# Patient Record
Sex: FEMALE | Race: WHITE | NOT HISPANIC OR LATINO | Employment: FULL TIME | URBAN - METROPOLITAN AREA
[De-identification: names, ages, dates, MRNs, and addresses within clinical notes are randomized per-mention and may not be internally consistent; named-entity substitution may affect disease eponyms.]

---

## 2018-01-12 RX ORDER — DROSPIRENONE AND ETHINYL ESTRADIOL 0.02-3(28)
KIT ORAL
Qty: 84 TABLET | Refills: 0 | OUTPATIENT
Start: 2018-01-12

## 2018-09-07 ENCOUNTER — OFFICE VISIT (OUTPATIENT)
Dept: OBSTETRICS AND GYNECOLOGY | Facility: CLINIC | Age: 28
End: 2018-09-07

## 2018-09-07 VITALS
BODY MASS INDEX: 21.56 KG/M2 | DIASTOLIC BLOOD PRESSURE: 81 MMHG | HEART RATE: 104 BPM | SYSTOLIC BLOOD PRESSURE: 118 MMHG | HEIGHT: 71 IN | WEIGHT: 154 LBS

## 2018-09-07 DIAGNOSIS — Z30.41 ENCOUNTER FOR SURVEILLANCE OF CONTRACEPTIVE PILLS: ICD-10-CM

## 2018-09-07 DIAGNOSIS — Z01.419 PAP SMEAR, AS PART OF ROUTINE GYNECOLOGICAL EXAMINATION: Primary | ICD-10-CM

## 2018-09-07 DIAGNOSIS — Z72.0 TOBACCO USE: ICD-10-CM

## 2018-09-07 DIAGNOSIS — Z01.419 ENCOUNTER FOR GYNECOLOGICAL EXAMINATION WITHOUT ABNORMAL FINDING: ICD-10-CM

## 2018-09-07 PROCEDURE — 99395 PREV VISIT EST AGE 18-39: CPT | Performed by: OBSTETRICS & GYNECOLOGY

## 2018-09-07 RX ORDER — DROSPIRENONE AND ETHINYL ESTRADIOL 0.02-3(28)
1 KIT ORAL DAILY
Qty: 28 TABLET | Refills: 0 | Status: SHIPPED | OUTPATIENT
Start: 2018-09-07 | End: 2018-09-07 | Stop reason: SDUPTHER

## 2018-09-07 RX ORDER — DEXTROAMPHETAMINE SACCHARATE, AMPHETAMINE ASPARTATE, DEXTROAMPHETAMINE SULFATE AND AMPHETAMINE SULFATE 7.5; 7.5; 7.5; 7.5 MG/1; MG/1; MG/1; MG/1
TABLET ORAL
Refills: 0 | COMMUNITY
Start: 2018-08-31 | End: 2020-01-31

## 2018-09-07 RX ORDER — CITALOPRAM 40 MG/1
TABLET ORAL
Refills: 1 | COMMUNITY
Start: 2018-05-31 | End: 2020-01-31

## 2018-09-07 RX ORDER — DROSPIRENONE AND ETHINYL ESTRADIOL 0.02-3(28)
1 KIT ORAL DAILY
Qty: 84 TABLET | Refills: 3 | Status: SHIPPED | OUTPATIENT
Start: 2018-09-07 | End: 2020-01-31

## 2018-09-07 NOTE — PROGRESS NOTES
Subjective   Lin Harp is a 27 y.o. female  1, Para 1 AB 0, Living 1.  Last annual 2 years, last pap 3 years,.  Cc: Annual exam  History of Present Illness  She denies any gynecologic problems at this time.  The following portions of the patient's history were reviewed and updated as appropriate: allergies, current medications, past family history, past medical history, past social history, past surgical history and problem list.    Review of Systems   Constitutional: Negative for activity change, fatigue, fever and unexpected weight change.   HENT: Negative for hearing loss, sore throat and voice change.    Respiratory: Negative for cough, chest tightness, shortness of breath and wheezing.    Cardiovascular: Negative for chest pain, palpitations and leg swelling.   Gastrointestinal: Negative for abdominal distention, abdominal pain, anal bleeding, blood in stool, constipation, diarrhea, nausea, rectal pain and vomiting.   Endocrine: Negative for cold intolerance and heat intolerance.   Genitourinary: Negative for difficulty urinating, dyspareunia, dysuria, flank pain, frequency, genital sores, menstrual problem, pelvic pain, urgency, vaginal bleeding, vaginal discharge and vaginal pain.   Musculoskeletal: Negative for arthralgias and back pain.   Skin: Negative for rash.   Allergic/Immunologic: Negative for environmental allergies and food allergies.   Neurological: Negative for dizziness, tremors, syncope, weakness, light-headedness, numbness and headaches.   Hematological: Negative for adenopathy. Does not bruise/bleed easily.   Psychiatric/Behavioral: Negative for agitation, behavioral problems, self-injury, sleep disturbance and suicidal ideas. The patient is not nervous/anxious and is not hyperactive.          Past Medical History:   Diagnosis Date   • ADD (attention deficit disorder)      Menstrual History:  OB History      Para Term  AB Living    1 1 1     1    SAB TAB Ectopic Molar  "Multiple Live Births                        Menarche age:10  Patient's last menstrual period was 2018 (exact date).       Past Surgical History:   Procedure Laterality Date   • BILE DUCT STENT PLACEMENT      Subsequent removal   •  SECTION       OB History      Para Term  AB Living    1 1 1     1    SAB TAB Ectopic Molar Multiple Live Births                       Family History   Problem Relation Age of Onset   • Lung cancer Maternal Grandmother    • Cervical cancer Maternal Aunt    • Stroke Mother    • Stroke Paternal Grandmother      History   Smoking Status   • Current Every Day Smoker   • Packs/day: 0.33   • Years: 8.00   • Types: Cigarettes   Smokeless Tobacco   • Never Used     History   Alcohol Use   • Yes     Comment: Occasional     Health Maintenance   Topic Date Due   • ANNUAL PHYSICAL  10/21/1992   • PNEUMOCOCCAL VACCINE (19-64 MEDIUM RISK) (1 of 1 - PPSV23) 10/21/2008   • PAP SMEAR  2018   • INFLUENZA VACCINE  2018   • TDAP/TD VACCINES (2 - Td) 2024       Current Outpatient Prescriptions:   •  amphetamine-dextroamphetamine (ADDERALL) 30 MG tablet, TK 1 T PO BID, Disp: , Rfl: 0  •  citalopram (CeleXA) 40 MG tablet, TK 1 T PO QHS, Disp: , Rfl: 1  Sexual History: Active  STD: Negative       I advised the patient of the risks in continuing to use tobacco, and I provided this patient with smoking cessation educational materials.  I also discussed how to quit smoking and the patient has expressed the willingness to quit.      During this visit, I spent > 3-10 minutes counseling the patient regarding smoking cessation.       Objective   Vitals:    18 0935   BP: 118/81   Pulse: 104   Weight: 69.9 kg (154 lb)   Height: 180.3 cm (71\")     Physical Exam   Constitutional: She is oriented to person, place, and time. She appears well-developed and well-nourished.   HENT:   Head: Normocephalic.   Eyes: Pupils are equal, round, and reactive to light.   Neck: Normal " range of motion. No thyromegaly present.   Cardiovascular: Normal rate, regular rhythm, normal heart sounds and intact distal pulses.    Pulmonary/Chest: Effort normal and breath sounds normal. No respiratory distress. She exhibits no tenderness. Right breast exhibits no inverted nipple, no mass, no nipple discharge, no skin change and no tenderness. Left breast exhibits no inverted nipple, no mass, no nipple discharge, no skin change and no tenderness. Breasts are symmetrical.   Abdominal: Soft. Bowel sounds are normal. Hernia confirmed negative in the right inguinal area and confirmed negative in the left inguinal area.   Genitourinary: Vagina normal and uterus normal. No breast tenderness or discharge. Pelvic exam was performed with patient supine. There is no rash, tenderness, lesion or injury on the right labia. There is no rash, tenderness, lesion or injury on the left labia. Uterus is not enlarged and not tender. Cervix exhibits no motion tenderness, no discharge and no friability. Right adnexum displays no mass, no tenderness and no fullness. Left adnexum displays no mass, no tenderness and no fullness.   Lymphadenopathy:     She has no cervical adenopathy.        Right: No inguinal adenopathy present.        Left: No inguinal adenopathy present.   Neurological: She is alert and oriented to person, place, and time. She has normal reflexes.   Skin: Skin is warm and dry.   Psychiatric: She has a normal mood and affect. Her behavior is normal. Judgment and thought content normal.         Assessment/Plan   Lin was seen today for gynecologic exam.    Diagnoses and all orders for this visit:    Pap smear, as part of routine gynecological examination  -     IGP, Apt HPV,rfx 16 / 18,45    Patient was counseled about breast self-examination, Pap smears, STD prophylaxis, contraception

## 2018-09-11 ENCOUNTER — TELEPHONE (OUTPATIENT)
Dept: OBSTETRICS AND GYNECOLOGY | Facility: CLINIC | Age: 28
End: 2018-09-11

## 2018-09-11 NOTE — TELEPHONE ENCOUNTER
Pt called and stated she was having trouble using the bathroom. Trouble with bowel movements. Pt stated she has to press down when using the bathroom. Pt was wanting to know what she should do. Please advise.

## 2018-09-12 LAB
CYTOLOGIST CVX/VAG CYTO: NORMAL
CYTOLOGY CVX/VAG DOC THIN PREP: NORMAL
DX ICD CODE: NORMAL
HIV 1 & 2 AB SER-IMP: NORMAL
HPV I/H RISK 4 DNA CVX QL PROBE+SIG AMP: NEGATIVE
OTHER STN SPEC: NORMAL
PATH REPORT.FINAL DX SPEC: NORMAL
STAT OF ADQ CVX/VAG CYTO-IMP: NORMAL

## 2020-01-16 ENCOUNTER — TELEPHONE (OUTPATIENT)
Dept: OBSTETRICS AND GYNECOLOGY | Facility: CLINIC | Age: 30
End: 2020-01-16

## 2020-01-16 NOTE — TELEPHONE ENCOUNTER
FMLA papers received via fax. Message left for patient to call office.  Patient is not scheduled until 1/31/20 for first OB visit.

## 2020-01-31 ENCOUNTER — PROCEDURE VISIT (OUTPATIENT)
Dept: OBSTETRICS AND GYNECOLOGY | Facility: CLINIC | Age: 30
End: 2020-01-31

## 2020-01-31 ENCOUNTER — INITIAL PRENATAL (OUTPATIENT)
Dept: OBSTETRICS AND GYNECOLOGY | Facility: CLINIC | Age: 30
End: 2020-01-31

## 2020-01-31 VITALS — SYSTOLIC BLOOD PRESSURE: 128 MMHG | DIASTOLIC BLOOD PRESSURE: 86 MMHG | WEIGHT: 190 LBS | BODY MASS INDEX: 26.5 KG/M2

## 2020-01-31 DIAGNOSIS — Z34.90 EARLY STAGE OF PREGNANCY: Primary | ICD-10-CM

## 2020-01-31 DIAGNOSIS — Z34.91 UNCERTAIN DATES, ANTEPARTUM, FIRST TRIMESTER: Primary | ICD-10-CM

## 2020-01-31 LAB
B-HCG UR QL: POSITIVE
GLUCOSE UR STRIP-MCNC: NEGATIVE MG/DL
INTERNAL NEGATIVE CONTROL: NEGATIVE
INTERNAL POSITIVE CONTROL: POSITIVE
Lab: ABNORMAL
PROT UR STRIP-MCNC: ABNORMAL MG/DL

## 2020-01-31 PROCEDURE — 99213 OFFICE O/P EST LOW 20 MIN: CPT | Performed by: OBSTETRICS & GYNECOLOGY

## 2020-01-31 PROCEDURE — 81025 URINE PREGNANCY TEST: CPT | Performed by: OBSTETRICS & GYNECOLOGY

## 2020-01-31 PROCEDURE — 76817 TRANSVAGINAL US OBSTETRIC: CPT | Performed by: OBSTETRICS & GYNECOLOGY

## 2020-01-31 RX ORDER — PRENATAL VIT NO.126/IRON/FOLIC 28MG-0.8MG
1 TABLET ORAL DAILY
Qty: 30 TABLET | Refills: 12 | Status: SHIPPED | OUTPATIENT
Start: 2020-01-31 | End: 2021-03-01

## 2020-01-31 RX ORDER — PRENATAL VIT NO.126/IRON/FOLIC 28MG-0.8MG
TABLET ORAL DAILY
COMMUNITY
End: 2020-01-31 | Stop reason: SDUPTHER

## 2020-01-31 NOTE — PROGRESS NOTES
Chief Complaint   Patient presents with   • Initial Prenatal Visit     HPI- Pt is 29 y.o.  at 8w6d here for prenatal visit.  Patient presents today for initial OB visit.  She states she is sure regarding her LMP.  Pregnancy was unplanned.  Patient has 1 previous pregnancy 9 years ago and she had a  at 41 weeks for fetal distress.  Baby was 9 pounds 15 ounces, but patient denies any diabetes or other complications during that pregnancy.  Patient does report that she initially had some nausea and vomiting after finding out she was pregnant, but those symptoms have resolved.  Patient was on Adderall and states she stopped this medication when she found out she was pregnant.  She has no major complaints today.    ROS-     - No vaginal bleeding    GI- No abdominal pain    /86   Wt 86.2 kg (190 lb)   LMP 2019 (Exact Date)   BMI 26.50 kg/m²   Exam - See flow sheet    Fetal heart rate is normal    Assessment-  Diagnoses and all orders for this visit:    Early stage of pregnancy  -     OB Panel With HIV  -     Urine Culture - Urine, Urine, Clean Catch  -     Chlamydia trachomatis, Neisseria gonorrhoeae, Trichomonas vaginalis, PCR - Swab, Vagina  -     Comprehensive Metabolic Panel    Other orders  -     Discontinue: Prenatal Vit-Fe Fumarate-FA (PRENATAL, CLASSIC, VITAMIN) 28-0.8 MG tablet tablet; Take  by mouth Daily.  -     POC Urinalysis Dipstick  -     Prenatal Vit-Fe Fumarate-FA (PRENATAL, CLASSIC, VITAMIN) 28-0.8 MG tablet tablet; Take 1 tablet by mouth Daily.      Initial OB counseling was done.  Patient's first blood pressure was mildly elevated and repeat was normal.  She denies any history of hypertension.  Discussed genetic screening options that are available in pregnancy.  Discussed delivering hospital and call system.  Dating/viability ultrasound was ordered and OB labs were ordered.  Patient will follow-up in 2 weeks for repeat blood pressure.

## 2020-02-01 LAB
ALBUMIN SERPL-MCNC: 4 G/DL (ref 3.5–5.2)
ALBUMIN/GLOB SERPL: 1.6 G/DL
ALP SERPL-CCNC: 63 U/L (ref 39–117)
ALT SERPL-CCNC: 35 U/L (ref 1–33)
AST SERPL-CCNC: 21 U/L (ref 1–32)
BILIRUB SERPL-MCNC: 0.3 MG/DL (ref 0.2–1.2)
BUN SERPL-MCNC: 7 MG/DL (ref 6–20)
BUN/CREAT SERPL: 11.7 (ref 7–25)
CALCIUM SERPL-MCNC: 8.8 MG/DL (ref 8.6–10.5)
CHLORIDE SERPL-SCNC: 103 MMOL/L (ref 98–107)
CO2 SERPL-SCNC: 21.9 MMOL/L (ref 22–29)
CREAT SERPL-MCNC: 0.6 MG/DL (ref 0.57–1)
GLOBULIN SER CALC-MCNC: 2.5 GM/DL
GLUCOSE SERPL-MCNC: 82 MG/DL (ref 65–99)
POTASSIUM SERPL-SCNC: 4.3 MMOL/L (ref 3.5–5.2)
PROT SERPL-MCNC: 6.5 G/DL (ref 6–8.5)
SODIUM SERPL-SCNC: 136 MMOL/L (ref 136–145)

## 2020-02-02 LAB
BACTERIA UR CULT: NO GROWTH
BACTERIA UR CULT: NORMAL

## 2020-02-03 ENCOUNTER — TELEPHONE (OUTPATIENT)
Dept: OBSTETRICS AND GYNECOLOGY | Facility: CLINIC | Age: 30
End: 2020-02-03

## 2020-02-03 LAB
ABO GROUP BLD: ABNORMAL
BASOPHILS # BLD AUTO: 0 X10E3/UL (ref 0–0.2)
BASOPHILS NFR BLD AUTO: 1 %
BLD GP AB SCN SERPL QL: NEGATIVE
C TRACH RRNA SPEC QL NAA+PROBE: NEGATIVE
EOSINOPHIL # BLD AUTO: 0.3 X10E3/UL (ref 0–0.4)
EOSINOPHIL NFR BLD AUTO: 4 %
ERYTHROCYTE [DISTWIDTH] IN BLOOD BY AUTOMATED COUNT: 12.3 % (ref 11.7–15.4)
HBV SURFACE AG SERPL QL IA: NEGATIVE
HCT VFR BLD AUTO: 38.1 % (ref 34–46.6)
HCV AB S/CO SERPL IA: <0.1 S/CO RATIO (ref 0–0.9)
HGB BLD-MCNC: 13.2 G/DL (ref 11.1–15.9)
HIV 1+2 AB+HIV1 P24 AG SERPL QL IA: NON REACTIVE
IMM GRANULOCYTES # BLD AUTO: 0 X10E3/UL (ref 0–0.1)
IMM GRANULOCYTES NFR BLD AUTO: 0 %
LYMPHOCYTES # BLD AUTO: 1.9 X10E3/UL (ref 0.7–3.1)
LYMPHOCYTES NFR BLD AUTO: 26 %
MCH RBC QN AUTO: 31.1 PG (ref 26.6–33)
MCHC RBC AUTO-ENTMCNC: 34.6 G/DL (ref 31.5–35.7)
MCV RBC AUTO: 90 FL (ref 79–97)
MONOCYTES # BLD AUTO: 0.6 X10E3/UL (ref 0.1–0.9)
MONOCYTES NFR BLD AUTO: 9 %
N GONORRHOEA RRNA SPEC QL NAA+PROBE: NEGATIVE
NEUTROPHILS # BLD AUTO: 4.2 X10E3/UL (ref 1.4–7)
NEUTROPHILS NFR BLD AUTO: 60 %
PLATELET # BLD AUTO: 397 X10E3/UL (ref 150–450)
RBC # BLD AUTO: 4.24 X10E6/UL (ref 3.77–5.28)
RH BLD: POSITIVE
RPR SER QL: REACTIVE
RPR SER-TITR: NORMAL {TITER}
RUBV IGG SERPL IA-ACNC: 3.22 INDEX
T VAGINALIS DNA SPEC QL NAA+PROBE: NEGATIVE
TREPONEMA PALLIDUM IGG+IGM AB [PRESENCE] IN SERUM OR PLASMA BY IMMUNOASSAY: NON REACTIVE
WBC # BLD AUTO: 7.1 X10E3/UL (ref 3.4–10.8)

## 2020-02-06 ENCOUNTER — TELEPHONE (OUTPATIENT)
Dept: OBSTETRICS AND GYNECOLOGY | Facility: CLINIC | Age: 30
End: 2020-02-06

## 2020-02-06 NOTE — TELEPHONE ENCOUNTER
----- Message from Quentin Post CMA sent at 2/6/2020 10:50 AM EST -----  Regarding: FW: Test Results Question  Contact: [phone]   [employer] message.  Lauretn patient   ----- Message -----  From: Lin Harp  Sent: 2/6/2020   7:11 AM EST  To: [facility]  Subject: Test Results Question                            I have a question about OB PANEL WITH HIV resulted on 2/3/20, 2:35 PM.    Are these test results normal?

## 2020-02-06 NOTE — TELEPHONE ENCOUNTER
Patient called concerning reactive RPR.  Explained that treponemal antibody negative so this is a false positive for syphilis.  KAIDEN

## 2020-02-14 ENCOUNTER — PROCEDURE VISIT (OUTPATIENT)
Dept: OBSTETRICS AND GYNECOLOGY | Facility: CLINIC | Age: 30
End: 2020-02-14

## 2020-02-14 ENCOUNTER — ROUTINE PRENATAL (OUTPATIENT)
Dept: OBSTETRICS AND GYNECOLOGY | Facility: CLINIC | Age: 30
End: 2020-02-14

## 2020-02-14 VITALS — SYSTOLIC BLOOD PRESSURE: 127 MMHG | WEIGHT: 198 LBS | BODY MASS INDEX: 27.62 KG/M2 | DIASTOLIC BLOOD PRESSURE: 88 MMHG

## 2020-02-14 DIAGNOSIS — Z34.82 ENCOUNTER FOR SUPERVISION OF OTHER NORMAL PREGNANCY IN SECOND TRIMESTER: Primary | ICD-10-CM

## 2020-02-14 DIAGNOSIS — O36.80X0 PREGNANCY WITH UNCERTAIN FETAL VIABILITY, SINGLE OR UNSPECIFIED FETUS: ICD-10-CM

## 2020-02-14 LAB
GLUCOSE UR STRIP-MCNC: NEGATIVE MG/DL
PROT UR STRIP-MCNC: ABNORMAL MG/DL

## 2020-02-14 PROCEDURE — 99212 OFFICE O/P EST SF 10 MIN: CPT | Performed by: OBSTETRICS & GYNECOLOGY

## 2020-02-14 PROCEDURE — 76801 OB US < 14 WKS SINGLE FETUS: CPT | Performed by: OBSTETRICS & GYNECOLOGY

## 2020-02-14 NOTE — PROGRESS NOTES
CC:  Pregnancy  Patient's blood pressure today is normal.  Reviewed initial OB labs and dating ultrasound with patient.  She does desire cell free DNA testing.  Since blood pressure today is normal, she will see me back in 4 weeks.  A/P:  Supervision of pregnancy at 10 weeks  --[id number] ordered  --F/U in 4 weeks

## 2020-02-19 LAB
CFDNA.FET/CFDNA.TOTAL SFR FETUS: NORMAL %
CFDNA.FET/CFDNA.TOTAL SFR FETUS: NORMAL %
CITATION REF LAB TEST: NORMAL
FET CHR 13 TS PLAS.CFDNA QL: NEGATIVE
FET CHR 13+18+21 TS PLAS.CFDNA QL: NORMAL
FET CHR 18 TS PLAS.CFDNA QL: NEGATIVE
FET CHR 21 TS PLAS.CFDNA QL: NEGATIVE
FET Y CHROM PLAS.CFDNA QL: DETECTED
FET Y CHROM PLAS.CFDNA: NORMAL
GA EST FROM CONCEPTION DATE: NORMAL D
GESTATIONAL AGE > 9:: YES
LAB DIRECTOR NAME PROVIDER: NORMAL
LABORATORY COMMENT REPORT: NORMAL
LIMITATIONS OF THE TEST: NORMAL
NOTE: NORMAL
POSITIVE PREDICTIVE VALUE: NORMAL
REF LAB TEST METHOD: NORMAL
SERVICE CMNT 02-IMP: NORMAL
SERVICE CMNT 03-IMP: NORMAL
SERVICE CMNT-IMP: NORMAL
TEST PERFORMANCE INFO SPEC: NORMAL
TEST PERFORMANCE INFO SPEC: NORMAL

## 2020-02-20 ENCOUNTER — TELEPHONE (OUTPATIENT)
Dept: OBSTETRICS AND GYNECOLOGY | Facility: CLINIC | Age: 30
End: 2020-02-20

## 2020-02-20 NOTE — TELEPHONE ENCOUNTER
----- Message from Kacy Mancilla MD sent at 2/20/2020  8:20 AM EST -----  Let patient know that her genetic test was normal and if she wants to know gender, it showed a male fetus

## 2020-02-24 NOTE — TELEPHONE ENCOUNTER
Patient called back made her aware genetic testing was normal and she did want to know the gender. Made her aware she is having a boy.

## 2020-03-13 ENCOUNTER — ROUTINE PRENATAL (OUTPATIENT)
Dept: OBSTETRICS AND GYNECOLOGY | Facility: CLINIC | Age: 30
End: 2020-03-13

## 2020-03-13 VITALS — BODY MASS INDEX: 28.59 KG/M2 | DIASTOLIC BLOOD PRESSURE: 87 MMHG | WEIGHT: 205 LBS | SYSTOLIC BLOOD PRESSURE: 145 MMHG

## 2020-03-13 DIAGNOSIS — Z3A.14 14 WEEKS GESTATION OF PREGNANCY: ICD-10-CM

## 2020-03-13 DIAGNOSIS — O10.919 CHRONIC HYPERTENSION AFFECTING PREGNANCY: Primary | ICD-10-CM

## 2020-03-13 PROBLEM — Z98.891 PREVIOUS CESAREAN SECTION: Status: ACTIVE | Noted: 2020-03-13

## 2020-03-13 LAB
GLUCOSE UR STRIP-MCNC: NEGATIVE MG/DL
PROT UR STRIP-MCNC: NEGATIVE MG/DL

## 2020-03-13 PROCEDURE — 99213 OFFICE O/P EST LOW 20 MIN: CPT | Performed by: OBSTETRICS & GYNECOLOGY

## 2020-03-13 NOTE — PROGRESS NOTES
CC:  Pregnancy  Patient has 2 mildly elevated blood pressures today.  She reports a headache that started earlier today and reports recent lack of sleep and stress due to work.  She is requesting a note to work only 8-hour shifts.  Patient was given a note today to only work 8-hour shifts due to hypertension.  Explained to her that she does meet criteria for chronic hypertension and we will go ahead and start her on 81 mg of aspirin and she has been given instructions to collect a baseline 24-hour urine protein.  At this time we will continue to observe her blood pressures and she will see me back in 2 weeks.  We will start labetalol if blood pressures continue to be increased.  A/P:  Supervision of pregnancy at 14 weeks with chronic hypertension  --Start 81 mg ASA  --Collect 24 hour urine protein  --Followup in 2 weeks

## 2020-03-18 ENCOUNTER — TELEPHONE (OUTPATIENT)
Dept: OBSTETRICS AND GYNECOLOGY | Facility: CLINIC | Age: 30
End: 2020-03-18

## 2020-03-18 RX ORDER — NITROFURANTOIN 25; 75 MG/1; MG/1
100 CAPSULE ORAL 2 TIMES DAILY
Qty: 14 CAPSULE | Refills: 0 | Status: SHIPPED | OUTPATIENT
Start: 2020-03-18 | End: 2020-03-25

## 2020-03-18 NOTE — TELEPHONE ENCOUNTER
Patient 15.4 wk OB and c/o dysuria. She stated she had frequent UTI's her last pregnancy.  Allergies and [facility]'s pharmacy on file. Patient stated she does not want to come in and leave sample if at all possible due to current Corona concerns. Thank you.

## 2020-03-18 NOTE — TELEPHONE ENCOUNTER
I have sent a prescription in for Macrobid, but if patient does not feel better, or has any fevers or chills, she would need to go to the hospital.

## 2020-03-20 ENCOUNTER — TELEPHONE (OUTPATIENT)
Dept: OBSTETRICS AND GYNECOLOGY | Facility: CLINIC | Age: 30
End: 2020-03-20

## 2020-03-20 NOTE — TELEPHONE ENCOUNTER
Patient called and wanted to clarify some things on her quarantine restrictions. She wanted to know when the quarantine should end. Will the 14 days begin today?     Fax number [phone]

## 2020-03-20 NOTE — TELEPHONE ENCOUNTER
Called and spoke with the patient.  Patient states her mother is currently having symptoms of coronavirus and has a test pending.  Patient's brother was exposed at his job.  I have advised patient to go on self quarantine and told her that we would give her any note for work stating that I have recommended self quarantine per CDC guidelines.  I have also advised patient to cancel her appointment next week and to follow-up in 3 weeks with me with an anatomy ultrasound.  She states she will let us know her mother's test results.

## 2020-03-20 NOTE — TELEPHONE ENCOUNTER
Patient called she said that her brother has been exposed to the corona virus. He is not showing symptoms but has been quarantined and she said that her mother started showing signs so she has been tested this morning but will not now results for about a week. She has came in contact with both of them as well as hers kids and is having a lot of concerns as to if she has contracted virus. She wants to know if she should cancel appointment next Wednesday even though they don't have confirmed case. Also she feels that she is at higher risk being pregnant wants to know what she should do now do to possible exposure?

## 2020-03-25 ENCOUNTER — TELEPHONE (OUTPATIENT)
Dept: OBSTETRICS AND GYNECOLOGY | Facility: CLINIC | Age: 30
End: 2020-03-25

## 2020-03-25 NOTE — TELEPHONE ENCOUNTER
Patient stated her mothers COVID19 results came back negative. She would like to know if she should still self quarantine or go back to work?

## 2020-03-25 NOTE — TELEPHONE ENCOUNTER
If her mother's test came back negative, she does not need to self quarantine and may return to work.

## 2020-03-25 NOTE — TELEPHONE ENCOUNTER
----- Message from Nadia Goddard sent at 3/25/2020  2:04 PM EDT -----  Regarding: Non-Urgent Medical Question  Contact: [phone]  My Chart message.    ----- Message -----  From: Lin Harp  Sent: 3/25/2020  10:50 AM EDT  To: [facility] Pool  Subject: Non-Urgent Medical Question                      My mother's results were negative for Coronavirus just wanted to keep you updated. Because we know my brother was exposed to a positive patient should I still continue to quarantine?

## 2020-04-10 ENCOUNTER — PROCEDURE VISIT (OUTPATIENT)
Dept: OBSTETRICS AND GYNECOLOGY | Facility: CLINIC | Age: 30
End: 2020-04-10

## 2020-04-10 ENCOUNTER — ROUTINE PRENATAL (OUTPATIENT)
Dept: OBSTETRICS AND GYNECOLOGY | Facility: CLINIC | Age: 30
End: 2020-04-10

## 2020-04-10 VITALS — SYSTOLIC BLOOD PRESSURE: 131 MMHG | WEIGHT: 209 LBS | DIASTOLIC BLOOD PRESSURE: 85 MMHG | BODY MASS INDEX: 29.15 KG/M2

## 2020-04-10 DIAGNOSIS — Z36.89 ENCOUNTER FOR FETAL ANATOMIC SURVEY: Primary | ICD-10-CM

## 2020-04-10 DIAGNOSIS — Z3A.18 18 WEEKS GESTATION OF PREGNANCY: ICD-10-CM

## 2020-04-10 DIAGNOSIS — O10.919 CHRONIC HYPERTENSION AFFECTING PREGNANCY: Primary | ICD-10-CM

## 2020-04-10 LAB
GLUCOSE UR STRIP-MCNC: NEGATIVE MG/DL
PROT UR STRIP-MCNC: NEGATIVE MG/DL

## 2020-04-10 PROCEDURE — 99213 OFFICE O/P EST LOW 20 MIN: CPT | Performed by: OBSTETRICS & GYNECOLOGY

## 2020-04-10 PROCEDURE — 76805 OB US >/= 14 WKS SNGL FETUS: CPT | Performed by: OBSTETRICS & GYNECOLOGY

## 2020-04-10 NOTE — PROGRESS NOTES
CC:  Pregnancy  Patient feels well today.  She has no major complaints.  She does report that her right ankle swells after standing on her legs for long periods of time.  She reports the swelling improves after she sits down and elevates her feet.  Patient is only noted to have trace swelling on exam today.  Discussed with her compression stockings when standing for long periods of time.  Patient's blood pressure today is normal.  She has completed a 24-hour urine protein and plans to bring it on Monday.  Anatomy ultrasound was performed today and shows normal-appearing anatomy.  A/P: Supervision of pregnancy at 18 weeks with chronic hypertension  --Follow-up in 4 weeks

## 2020-04-14 LAB
PROT 24H UR-MRATE: 186 MG/24HOURS (ref 0–150)
PROT UR-MCNC: 12 MG/DL

## 2020-05-08 ENCOUNTER — ROUTINE PRENATAL (OUTPATIENT)
Dept: OBSTETRICS AND GYNECOLOGY | Facility: CLINIC | Age: 30
End: 2020-05-08

## 2020-05-08 VITALS — WEIGHT: 219.6 LBS | SYSTOLIC BLOOD PRESSURE: 125 MMHG | BODY MASS INDEX: 30.63 KG/M2 | DIASTOLIC BLOOD PRESSURE: 84 MMHG

## 2020-05-08 DIAGNOSIS — O10.919 CHRONIC HYPERTENSION AFFECTING PREGNANCY: Primary | ICD-10-CM

## 2020-05-08 DIAGNOSIS — Z3A.22 22 WEEKS GESTATION OF PREGNANCY: ICD-10-CM

## 2020-05-08 LAB
GLUCOSE UR STRIP-MCNC: NEGATIVE MG/DL
PROT UR STRIP-MCNC: NEGATIVE MG/DL

## 2020-05-08 PROCEDURE — 99212 OFFICE O/P EST SF 10 MIN: CPT | Performed by: OBSTETRICS & GYNECOLOGY

## 2020-05-08 NOTE — PROGRESS NOTES
CC:  Pregnancy  Patient feels well.  She has no complaints.  Blood pressure today is normal.  Discussed 1 hour glucose test and CBC at her next visit.  A/P: Supervision of pregnancy at 22 weeks with chronic hypertension  --Follow-up in 4 weeks

## 2020-06-08 ENCOUNTER — TELEPHONE (OUTPATIENT)
Dept: OBSTETRICS AND GYNECOLOGY | Facility: CLINIC | Age: 30
End: 2020-06-08

## 2020-06-08 NOTE — TELEPHONE ENCOUNTER
Patient was wondering if we could write a note stating she can wear a shield instead of a mask. They will allow that with a doctors note.

## 2020-06-08 NOTE — TELEPHONE ENCOUNTER
27 weeks pregnant. Her work has now started requiring mandatory mask. Where she works has no air conditioning. Mask is causing her to feel dizzy and nauseated. Cannot leave nose uncovered. Also having swelling in legs that is getting worse the hotter it gets. Sometimes bottom of feet go numb but goes away when put feet up. Please advise. Pt Ph [phone]

## 2020-06-08 NOTE — TELEPHONE ENCOUNTER
I cannot give her a note stating that she cannot wear the mask since masks are recommended for her protection, especially during pregnancy.  If her swelling improves when putting her feet up and worsens with heat, this is considered normal in pregnancy.  We can discuss further at her visit on Friday.

## 2020-06-09 NOTE — TELEPHONE ENCOUNTER
She may have a note stating that she can wear a shield but it should cover her nose and mouth.  She may still wear a mask when in our office.

## 2020-06-12 ENCOUNTER — ROUTINE PRENATAL (OUTPATIENT)
Dept: OBSTETRICS AND GYNECOLOGY | Facility: CLINIC | Age: 30
End: 2020-06-12

## 2020-06-12 VITALS — SYSTOLIC BLOOD PRESSURE: 122 MMHG | BODY MASS INDEX: 31.1 KG/M2 | WEIGHT: 223 LBS | DIASTOLIC BLOOD PRESSURE: 86 MMHG

## 2020-06-12 DIAGNOSIS — Z3A.27 27 WEEKS GESTATION OF PREGNANCY: ICD-10-CM

## 2020-06-12 DIAGNOSIS — O10.919 CHRONIC HYPERTENSION AFFECTING PREGNANCY: Primary | ICD-10-CM

## 2020-06-12 LAB
GLUCOSE UR STRIP-MCNC: NEGATIVE MG/DL
PROT UR STRIP-MCNC: NEGATIVE MG/DL

## 2020-06-12 PROCEDURE — 99213 OFFICE O/P EST LOW 20 MIN: CPT | Performed by: OBSTETRICS & GYNECOLOGY

## 2020-06-12 NOTE — PROGRESS NOTES
CC:  Pregnancy  Patient reports pelvic pressure.  She reports good fetal movement.  Patient does report swelling in her lower extremities after standing at work.  She reports that it resolves with rest.  Patient is doing her 1 hour glucose test today.  Blood pressure today is normal.  A/P: Supervision of pregnancy at 27 weeks with chronic hypertension  --Follow-up in 2 weeks

## 2020-06-13 LAB
ERYTHROCYTE [DISTWIDTH] IN BLOOD BY AUTOMATED COUNT: 11.9 % (ref 12.3–15.4)
GLUCOSE 1H P 50 G GLC PO SERPL-MCNC: 112 MG/DL (ref 65–179)
HCT VFR BLD AUTO: 36.3 % (ref 34–46.6)
HGB BLD-MCNC: 12.1 G/DL (ref 12–15.9)
MCH RBC QN AUTO: 31 PG (ref 26.6–33)
MCHC RBC AUTO-ENTMCNC: 33.3 G/DL (ref 31.5–35.7)
MCV RBC AUTO: 93.1 FL (ref 79–97)
PLATELET # BLD AUTO: 336 10*3/MM3 (ref 140–450)
RBC # BLD AUTO: 3.9 10*6/MM3 (ref 3.77–5.28)
WBC # BLD AUTO: 9.67 10*3/MM3 (ref 3.4–10.8)

## 2020-06-15 ENCOUNTER — TELEPHONE (OUTPATIENT)
Dept: OBSTETRICS AND GYNECOLOGY | Facility: CLINIC | Age: 30
End: 2020-06-15

## 2020-06-15 NOTE — TELEPHONE ENCOUNTER
----- Message from Kacy Mancilla MD sent at 6/15/2020  8:53 AM EDT -----  Please let patient know she passed her glucose test.

## 2020-06-18 ENCOUNTER — TELEPHONE (OUTPATIENT)
Dept: OBSTETRICS AND GYNECOLOGY | Facility: CLINIC | Age: 30
End: 2020-06-18

## 2020-06-18 NOTE — TELEPHONE ENCOUNTER
I called and left a vm regarding patient and asked what labs she is needing to talk about.  I let her know that you were out of the office until tomorrow and would give her a call when you are back in office.

## 2020-06-18 NOTE — TELEPHONE ENCOUNTER
Ela from [facility] called. She is patients  and would like to speak with someone about patiens labs. Please advise  OK to leave     Ela  [phone] [phone]

## 2020-06-26 ENCOUNTER — ROUTINE PRENATAL (OUTPATIENT)
Dept: OBSTETRICS AND GYNECOLOGY | Facility: CLINIC | Age: 30
End: 2020-06-26

## 2020-06-26 VITALS — SYSTOLIC BLOOD PRESSURE: 122 MMHG | DIASTOLIC BLOOD PRESSURE: 86 MMHG | WEIGHT: 224 LBS | BODY MASS INDEX: 31.24 KG/M2

## 2020-06-26 DIAGNOSIS — Z3A.29 29 WEEKS GESTATION OF PREGNANCY: ICD-10-CM

## 2020-06-26 DIAGNOSIS — Z98.891 PREVIOUS CESAREAN SECTION: ICD-10-CM

## 2020-06-26 DIAGNOSIS — O10.919 CHRONIC HYPERTENSION AFFECTING PREGNANCY: Primary | ICD-10-CM

## 2020-06-26 LAB
GLUCOSE UR STRIP-MCNC: NEGATIVE MG/DL
PROT UR STRIP-MCNC: NEGATIVE MG/DL

## 2020-06-26 PROCEDURE — 90715 TDAP VACCINE 7 YRS/> IM: CPT | Performed by: OBSTETRICS & GYNECOLOGY

## 2020-06-26 PROCEDURE — 90471 IMMUNIZATION ADMIN: CPT | Performed by: OBSTETRICS & GYNECOLOGY

## 2020-06-26 PROCEDURE — 99212 OFFICE O/P EST SF 10 MIN: CPT | Performed by: OBSTETRICS & GYNECOLOGY

## 2020-06-26 NOTE — PROGRESS NOTES
CC:  Pregnancy  Patient complains of lower back pain after standing for long periods of time at work.  Discussed using a belly support band.  Patient's blood pressure today is normal.  She has passed her glucose test and will receive Tdap today.  She will follow-up in 2 weeks with growth ultrasound and BPP due to chronic hypertension.  A/P: Supervision of pregnancy at 29 weeks with chronic hypertension  --Follow-up in 2 weeks with growth ultrasound and BPP

## 2020-06-29 ENCOUNTER — TELEPHONE (OUTPATIENT)
Dept: OBSTETRICS AND GYNECOLOGY | Facility: CLINIC | Age: 30
End: 2020-06-29

## 2020-06-29 NOTE — TELEPHONE ENCOUNTER
I would advise that patient rest and retake her blood pressure.  If she is having persistent blood pressures where the top number is over 160 and the bottom number is over 100, I would advise that she be evaluated on labor and delivery.  She can take Tylenol for her headache, but if her headache does not resolve, she should also be evaluated on labor and delivery.

## 2020-06-29 NOTE — TELEPHONE ENCOUNTER
Patient called she said that she had a migraine and wasn't feeling well so she took her blood pressure and she said it was elevated it was 138/98 and she wanted to make you aware. She said she is at work, she would like a call back but if she cant answer she wants us to leave a detailed message on what she needs to do if anything.

## 2020-07-02 ENCOUNTER — TELEPHONE (OUTPATIENT)
Dept: OBSTETRICS AND GYNECOLOGY | Facility: CLINIC | Age: 30
End: 2020-07-02

## 2020-07-02 NOTE — TELEPHONE ENCOUNTER
"Laurent patient.   Patient states the \"outside\" of her left thigh has been numb x 2 days.  She states she is not having any other symptoms.  She wants to know if she should be concerned?  Please advise.  "

## 2020-07-02 NOTE — TELEPHONE ENCOUNTER
A little numbness on the side of her thigh usually is not an indication of a blood clot or anything serious.  She has an appointment next week with Dr. Mancilla  which should be fine but she can also move it up and see her sooner if she is worried.  KAIDEN

## 2020-07-10 ENCOUNTER — PROCEDURE VISIT (OUTPATIENT)
Dept: OBSTETRICS AND GYNECOLOGY | Facility: CLINIC | Age: 30
End: 2020-07-10

## 2020-07-10 ENCOUNTER — ROUTINE PRENATAL (OUTPATIENT)
Dept: OBSTETRICS AND GYNECOLOGY | Facility: CLINIC | Age: 30
End: 2020-07-10

## 2020-07-10 VITALS — WEIGHT: 226 LBS | BODY MASS INDEX: 31.52 KG/M2 | SYSTOLIC BLOOD PRESSURE: 134 MMHG | DIASTOLIC BLOOD PRESSURE: 87 MMHG

## 2020-07-10 DIAGNOSIS — Z3A.31 31 WEEKS GESTATION OF PREGNANCY: ICD-10-CM

## 2020-07-10 DIAGNOSIS — O10.919 CHRONIC HYPERTENSION AFFECTING PREGNANCY: Primary | ICD-10-CM

## 2020-07-10 LAB
GLUCOSE UR STRIP-MCNC: NEGATIVE MG/DL
PROT UR STRIP-MCNC: NEGATIVE MG/DL

## 2020-07-10 PROCEDURE — 76816 OB US FOLLOW-UP PER FETUS: CPT | Performed by: OBSTETRICS & GYNECOLOGY

## 2020-07-10 PROCEDURE — 76819 FETAL BIOPHYS PROFIL W/O NST: CPT | Performed by: OBSTETRICS & GYNECOLOGY

## 2020-07-10 PROCEDURE — 99213 OFFICE O/P EST LOW 20 MIN: CPT | Performed by: OBSTETRICS & GYNECOLOGY

## 2020-07-10 NOTE — PROGRESS NOTES
CC:  Pregnancy  Patient reports pain with fetal movement in her lower pelvis.  She denies any contractions, leaking, or bleeding.  Growth ultrasound today shows estimated fetal weight in the 70th percentile.  Patient's last baby was almost 10 pounds.  She is scheduled for repeat .  Her blood pressure remains normal.  Patient has decided to stop working next week due to pregnancy pain and will use short-term disability.  Note was given today to stop work on .  A/P: Supervision of pregnancy at 31 weeks with chronic hypertension  --Patient will start weekly BPP's due to chronic hypertension  --She will follow-up with me in 2 weeks

## 2020-07-17 ENCOUNTER — PROCEDURE VISIT (OUTPATIENT)
Dept: OBSTETRICS AND GYNECOLOGY | Facility: CLINIC | Age: 30
End: 2020-07-17

## 2020-07-17 DIAGNOSIS — O10.919 CHRONIC HYPERTENSION AFFECTING PREGNANCY: Primary | ICD-10-CM

## 2020-07-17 PROCEDURE — 76819 FETAL BIOPHYS PROFIL W/O NST: CPT | Performed by: OBSTETRICS & GYNECOLOGY

## 2020-07-27 ENCOUNTER — ROUTINE PRENATAL (OUTPATIENT)
Dept: OBSTETRICS AND GYNECOLOGY | Facility: CLINIC | Age: 30
End: 2020-07-27

## 2020-07-27 ENCOUNTER — PROCEDURE VISIT (OUTPATIENT)
Dept: OBSTETRICS AND GYNECOLOGY | Facility: CLINIC | Age: 30
End: 2020-07-27

## 2020-07-27 VITALS — SYSTOLIC BLOOD PRESSURE: 116 MMHG | DIASTOLIC BLOOD PRESSURE: 84 MMHG | WEIGHT: 229 LBS | BODY MASS INDEX: 31.94 KG/M2

## 2020-07-27 DIAGNOSIS — O10.919 CHRONIC HYPERTENSION AFFECTING PREGNANCY: Primary | ICD-10-CM

## 2020-07-27 DIAGNOSIS — Z3A.34 34 WEEKS GESTATION OF PREGNANCY: ICD-10-CM

## 2020-07-27 LAB
GLUCOSE UR STRIP-MCNC: NEGATIVE MG/DL
PROT UR STRIP-MCNC: NEGATIVE MG/DL

## 2020-07-27 PROCEDURE — 76819 FETAL BIOPHYS PROFIL W/O NST: CPT | Performed by: OBSTETRICS & GYNECOLOGY

## 2020-07-27 PROCEDURE — 99213 OFFICE O/P EST LOW 20 MIN: CPT | Performed by: OBSTETRICS & GYNECOLOGY

## 2020-07-27 NOTE — PROGRESS NOTES
CC:  Pregnancy  Patient complains of occasional headaches, but states they always resolve.  She states the headaches are not new.  She denies any vision changes.  Patient continues to have swelling worsens with long periods of standing, but states it improves with elevating her feet.  She also complains of pelvic pain and pressure and pain around her right upper quadrant, but states that she thinks it is related to the baby's position and musculoskeletal.  Patient's urine is negative for protein.  Her initial blood pressure was mildly elevated, but repeat was normal.  We will repeat preeclamptic labs today, but patient's symptoms are not consistent with preeclampsia and she has a history of chronic hypertension.  BPP today is 8 out of 8.  A/P: Supervision of pregnancy at 34 weeks with chronic hypertension  --Repeat CBC, CMP, and protein to creatinine ratio  --Continue with weekly BPP's and follow-up next week for blood pressure check

## 2020-07-28 LAB
ALBUMIN SERPL-MCNC: 3.5 G/DL (ref 3.5–5.2)
ALBUMIN/GLOB SERPL: 1.3 G/DL
ALP SERPL-CCNC: 101 U/L (ref 39–117)
ALT SERPL-CCNC: 10 U/L (ref 1–33)
AST SERPL-CCNC: 10 U/L (ref 1–32)
BILIRUB SERPL-MCNC: 0.3 MG/DL (ref 0–1.2)
BUN SERPL-MCNC: 4 MG/DL (ref 6–20)
BUN/CREAT SERPL: 7.8 (ref 7–25)
CALCIUM SERPL-MCNC: 8.8 MG/DL (ref 8.6–10.5)
CHLORIDE SERPL-SCNC: 103 MMOL/L (ref 98–107)
CO2 SERPL-SCNC: 19.1 MMOL/L (ref 22–29)
CREAT SERPL-MCNC: 0.51 MG/DL (ref 0.57–1)
CREAT UR-MCNC: 159.9 MG/DL
ERYTHROCYTE [DISTWIDTH] IN BLOOD BY AUTOMATED COUNT: 11.9 % (ref 12.3–15.4)
GLOBULIN SER CALC-MCNC: 2.6 GM/DL
GLUCOSE SERPL-MCNC: 113 MG/DL (ref 65–99)
HCT VFR BLD AUTO: 38 % (ref 34–46.6)
HGB BLD-MCNC: 12.5 G/DL (ref 12–15.9)
MCH RBC QN AUTO: 30.4 PG (ref 26.6–33)
MCHC RBC AUTO-ENTMCNC: 32.9 G/DL (ref 31.5–35.7)
MCV RBC AUTO: 92.5 FL (ref 79–97)
PLATELET # BLD AUTO: 303 10*3/MM3 (ref 140–450)
POTASSIUM SERPL-SCNC: 3.8 MMOL/L (ref 3.5–5.2)
PROT SERPL-MCNC: 6.1 G/DL (ref 6–8.5)
PROT UR-MCNC: 27.2 MG/DL
PROT/CREAT UR: 170.1 MG/G CREA (ref 0–200)
RBC # BLD AUTO: 4.11 10*6/MM3 (ref 3.77–5.28)
SODIUM SERPL-SCNC: 134 MMOL/L (ref 136–145)
WBC # BLD AUTO: 8.35 10*3/MM3 (ref 3.4–10.8)

## 2020-08-05 ENCOUNTER — ROUTINE PRENATAL (OUTPATIENT)
Dept: OBSTETRICS AND GYNECOLOGY | Facility: CLINIC | Age: 30
End: 2020-08-05

## 2020-08-05 ENCOUNTER — PROCEDURE VISIT (OUTPATIENT)
Dept: OBSTETRICS AND GYNECOLOGY | Facility: CLINIC | Age: 30
End: 2020-08-05

## 2020-08-05 VITALS — BODY MASS INDEX: 32.36 KG/M2 | SYSTOLIC BLOOD PRESSURE: 120 MMHG | WEIGHT: 232 LBS | DIASTOLIC BLOOD PRESSURE: 78 MMHG

## 2020-08-05 DIAGNOSIS — O10.919 CHRONIC HYPERTENSION AFFECTING PREGNANCY: Primary | ICD-10-CM

## 2020-08-05 DIAGNOSIS — Z34.80 SUPERVISION OF OTHER NORMAL PREGNANCY: Primary | ICD-10-CM

## 2020-08-05 LAB
GLUCOSE UR STRIP-MCNC: NEGATIVE MG/DL
PROT UR STRIP-MCNC: NEGATIVE MG/DL

## 2020-08-05 PROCEDURE — 76816 OB US FOLLOW-UP PER FETUS: CPT | Performed by: OBSTETRICS & GYNECOLOGY

## 2020-08-05 PROCEDURE — 76819 FETAL BIOPHYS PROFIL W/O NST: CPT | Performed by: OBSTETRICS & GYNECOLOGY

## 2020-08-05 PROCEDURE — 99214 OFFICE O/P EST MOD 30 MIN: CPT | Performed by: OBSTETRICS & GYNECOLOGY

## 2020-08-05 NOTE — PROGRESS NOTES
CC    follow-up prenatal visit.  Ultrasound today 7 pounds 11 ounces.  91.5%.  ALEJANDRO 12 cm.  Vertex.  BPP /8.  Good fetal movement.  Cervix long and closed.  No longer having headaches.  Discussed normal labs and ultrasound.  Return to office weekly with BP ultrasound.  Assessment.  35 weeks 4 days gestation with history of previous , LGA infants, history of chronic hypertension without medications  Plan return to office 1 week.

## 2020-08-11 ENCOUNTER — TELEPHONE (OUTPATIENT)
Dept: OBSTETRICS AND GYNECOLOGY | Facility: CLINIC | Age: 30
End: 2020-08-11

## 2020-08-11 NOTE — TELEPHONE ENCOUNTER
36 weeks. Since about 1:30 this morning has been experiencing severe dizziness and headache.  Headache is not severe but hurts on top of her head  Pt Ph .

## 2020-08-11 NOTE — TELEPHONE ENCOUNTER
Patient informed. She took blood pressure and said it was fine. Will try Tylenol and hydration for headache. If does not help will go to [facility]

## 2020-08-11 NOTE — TELEPHONE ENCOUNTER
If patient is able to take her blood pressure, I would recommend that she check her blood pressure.  She can also try Tylenol for the headache.  If patient's blood pressure is greater than 160/100, she needs to be seen on labor and delivery or if her headache is not improving with Tylenol and hydration, she needs to be seen on labor and delivery.

## 2020-08-12 ENCOUNTER — PROCEDURE VISIT (OUTPATIENT)
Dept: OBSTETRICS AND GYNECOLOGY | Facility: CLINIC | Age: 30
End: 2020-08-12

## 2020-08-12 ENCOUNTER — ROUTINE PRENATAL (OUTPATIENT)
Dept: OBSTETRICS AND GYNECOLOGY | Facility: CLINIC | Age: 30
End: 2020-08-12

## 2020-08-12 VITALS — BODY MASS INDEX: 32.69 KG/M2 | SYSTOLIC BLOOD PRESSURE: 132 MMHG | WEIGHT: 234.4 LBS | DIASTOLIC BLOOD PRESSURE: 84 MMHG

## 2020-08-12 DIAGNOSIS — O10.919 CHRONIC HYPERTENSION AFFECTING PREGNANCY: Primary | ICD-10-CM

## 2020-08-12 DIAGNOSIS — Z3A.36 36 WEEKS GESTATION OF PREGNANCY: ICD-10-CM

## 2020-08-12 LAB
GLUCOSE UR STRIP-MCNC: NEGATIVE MG/DL
PROT UR STRIP-MCNC: ABNORMAL MG/DL

## 2020-08-12 PROCEDURE — 76819 FETAL BIOPHYS PROFIL W/O NST: CPT | Performed by: OBSTETRICS & GYNECOLOGY

## 2020-08-12 PROCEDURE — 99214 OFFICE O/P EST MOD 30 MIN: CPT | Performed by: OBSTETRICS & GYNECOLOGY

## 2020-08-12 NOTE — PROGRESS NOTES
CC:  Pregnancy  Patient has no major complaints.  She reports feeling well.  She reports having dizziness yesterday, but blood pressures were normal and states dizziness resolved.  Her blood pressure today is normal.  BPP is 8 out of 8.  GBS culture was obtained and patient reports allergy to cephalexin and penicillin.  Patient will continue with weekly follow-up and is scheduled for repeat  in 3 weeks.  A/P: Supervision of pregnancy at 36 weeks with chronic hypertension  --GBS collected  --Follow-up in 1 week

## 2020-08-14 LAB — GP B STREP DNA SPEC QL NAA+PROBE: NEGATIVE

## 2020-08-18 ENCOUNTER — TELEPHONE (OUTPATIENT)
Dept: OBSTETRICS AND GYNECOLOGY | Facility: CLINIC | Age: 30
End: 2020-08-18

## 2020-08-18 ENCOUNTER — HOSPITAL ENCOUNTER (EMERGENCY)
Facility: HOSPITAL | Age: 30
Discharge: HOME | End: 2020-08-18
Attending: OBSTETRICS & GYNECOLOGY | Admitting: OBSTETRICS & GYNECOLOGY

## 2020-08-18 VITALS
SYSTOLIC BLOOD PRESSURE: 129 MMHG | BODY MASS INDEX: 32.62 KG/M2 | HEIGHT: 71 IN | TEMPERATURE: 97.9 F | HEART RATE: 82 BPM | WEIGHT: 233 LBS | RESPIRATION RATE: 18 BRPM | DIASTOLIC BLOOD PRESSURE: 67 MMHG

## 2020-08-18 PROCEDURE — 59025 FETAL NON-STRESS TEST: CPT

## 2020-08-18 PROCEDURE — 99283 EMERGENCY DEPT VISIT LOW MDM: CPT

## 2020-08-18 NOTE — OBED NOTES
[facility]  Lin Harp  : 1990  MRN: [id number]  CSN: [id number]    OB ED Provider Note    Subjective   Chief Complaint   Patient presents with   • Contractions     JACIEL; Pt reports vaginal bleeding upon wiping around 1445. Pt reports bright red blood on toilet paper. Pt states the vaginal bleeding stopped after that one wipe. Pt reports ctxs feeling ctxs since the bleeding with tightness in abdomen. Pt denies LOF. Pt reports positive FM.      Lin Harp is a 29 y.o. year old  with an Estimated Date of Delivery: 20 currently at 37w3d presenting with complaint of episode of vaginal bleeding earlier today with onset of cramping.  Patient has had prior  and has chronic hypertension controlled without medication. She reports noting bright red blood on tissue when wiping.  She has not had any further bleeding since and denies menstrual-type bleeding, loss of fluid, decreased fetal movement, or painful contractions.    Prenatal care has been with Dr. Mancilla.  It has been complicated by prior c/section and CHTN..    OB History    Para Term  AB Living   2 1 1 0 0 1   SAB TAB Ectopic Molar Multiple Live Births   0 0 0 0 0 0      # Outcome Date GA Lbr Jeronimo/2nd Weight Sex Delivery Anes PTL Lv   2 Current            1 Term 08/12/10 41w0d  4508 g (9 lb 15 oz)  CS-LTranv         Complications: Fetal distress affecting delivery     Past Medical History:   Diagnosis Date   • ADD (attention deficit disorder)    • Gestational hypertension    • Varicella      Past Surgical History:   Procedure Laterality Date   • BILE DUCT STENT PLACEMENT      Subsequent removal   •  SECTION     • WISDOM TOOTH EXTRACTION       No current facility-administered medications for this encounter.     Allergies   Allergen Reactions   • Cephalexin    • Erythromycin    • Penicillins      Social History    Tobacco Use      Smoking status: Former Smoker        Packs/day: 0.33        Years: 8.00         "Pack years: 2.64        Types: Cigarettes      Smokeless tobacco: Never Used    Review of Systems   Constitutional: Negative.    HENT: Negative.    Respiratory: Negative.    Cardiovascular: Negative.    Gastrointestinal: Positive for abdominal pain.   Genitourinary: Positive for vaginal bleeding.   Musculoskeletal: Negative.    Neurological: Negative.          Objective   /67   Pulse 82   Temp 97.9 °F (36.6 °C)   Resp 18   Ht 180.3 cm (71\")   Wt 106 kg (233 lb)   LMP 11/30/2019 (Exact Date)   BMI 32.50 kg/m²   General: well developed; well nourished  no acute distress   Abdomen: soft, non-tender; no masses  gravid    FHT's: category 1      Cervix: was checked (by me): 0 cm / 50 % / -2   Presentation: cephalic   Contractions: none   Chest: Unlabored respirations    CV:  RRR   Ext:   No C/C/E   Back: CVA tenderness is absent bilateral        Prenatal Labs  Lab Results   Component Value Date    HGB 12.5 07/27/2020    RUBELLAABIGG 3.22 01/31/2020    HEPBSAG Negative 01/31/2020    ABSCRN Negative 01/31/2020    WHX4ITB7 Non Reactive 01/31/2020    HEPCVIRUSABY <0.1 01/31/2020     06/12/2020    STREPGPB Negative 08/12/2020    URINECX Final report 01/31/2020    CHLAMNAA Negative 01/31/2020    NGONORRHON Negative 01/31/2020       Current Labs Reviewed   ABO & Rh: No results found for: LABABO, LABRH, ABID       Assessment   1. IUP at 37w3d  2. False labor at term with small amount of vaginal bleeding- no active bleeding noted on exam with small amount reported.  No history of previa or low-lying placenta.  Fetal status reassuring by NST.  Cervix not dilated.     Plan   1. Patient discharged home with return precautions given.  She has follow-up scheduled with OB/GYN in office tomorrow.    Rupa Funes MD  8/18/2020  18:11     "

## 2020-08-18 NOTE — TELEPHONE ENCOUNTER
Pt states that she has never experienced bleeding before, and she is bleeding now. She states that there is blood on the toilet paper when she wipes, and she used her fingers to see how bad she was bleeding and she stated that there was a good amount pf blood on her fingers.   She also states that she has been experiencing bad lower back pain. She is worried and would like to know if she needs to go to L&D  Please advise?

## 2020-08-19 ENCOUNTER — PROCEDURE VISIT (OUTPATIENT)
Dept: OBSTETRICS AND GYNECOLOGY | Facility: CLINIC | Age: 30
End: 2020-08-19

## 2020-08-19 ENCOUNTER — ROUTINE PRENATAL (OUTPATIENT)
Dept: OBSTETRICS AND GYNECOLOGY | Facility: CLINIC | Age: 30
End: 2020-08-19

## 2020-08-19 VITALS — BODY MASS INDEX: 32.78 KG/M2 | DIASTOLIC BLOOD PRESSURE: 88 MMHG | SYSTOLIC BLOOD PRESSURE: 121 MMHG | WEIGHT: 235 LBS

## 2020-08-19 DIAGNOSIS — Z98.891 PREVIOUS CESAREAN SECTION: ICD-10-CM

## 2020-08-19 DIAGNOSIS — O10.919 CHRONIC HYPERTENSION AFFECTING PREGNANCY: Primary | ICD-10-CM

## 2020-08-19 DIAGNOSIS — Z3A.37 37 WEEKS GESTATION OF PREGNANCY: ICD-10-CM

## 2020-08-19 LAB
GLUCOSE UR STRIP-MCNC: NEGATIVE MG/DL
PROT UR STRIP-MCNC: ABNORMAL MG/DL

## 2020-08-19 PROCEDURE — 76819 FETAL BIOPHYS PROFIL W/O NST: CPT | Performed by: OBSTETRICS & GYNECOLOGY

## 2020-08-19 PROCEDURE — 99213 OFFICE O/P EST LOW 20 MIN: CPT | Performed by: OBSTETRICS & GYNECOLOGY

## 2020-08-19 NOTE — PROGRESS NOTES
CC:  Pregnancy  Patient reports irregular contractions.  She was seen yesterday in labor and delivery after having some bright red bleeding.  Patient cervix was closed and she was not having regular contractions.  Patient has had no bleeding since yesterday and reports adequate fetal movement.  BPP today is 8 out of 8.  Labor precautions were reviewed.  She is scheduled for repeat  in 2 weeks.  A/P: Supervision of pregnancy at 37 weeks with chronic hypertension and previous   --Follow-up in 1 week with BPP

## 2020-08-26 ENCOUNTER — PROCEDURE VISIT (OUTPATIENT)
Dept: OBSTETRICS AND GYNECOLOGY | Facility: CLINIC | Age: 30
End: 2020-08-26

## 2020-08-26 ENCOUNTER — ROUTINE PRENATAL (OUTPATIENT)
Dept: OBSTETRICS AND GYNECOLOGY | Facility: CLINIC | Age: 30
End: 2020-08-26

## 2020-08-26 VITALS — DIASTOLIC BLOOD PRESSURE: 86 MMHG | BODY MASS INDEX: 32.92 KG/M2 | SYSTOLIC BLOOD PRESSURE: 130 MMHG | WEIGHT: 236 LBS

## 2020-08-26 DIAGNOSIS — O10.919 CHRONIC HYPERTENSION AFFECTING PREGNANCY: Primary | ICD-10-CM

## 2020-08-26 DIAGNOSIS — Z3A.38 38 WEEKS GESTATION OF PREGNANCY: ICD-10-CM

## 2020-08-26 DIAGNOSIS — Z98.891 PREVIOUS CESAREAN SECTION: ICD-10-CM

## 2020-08-26 LAB
GLUCOSE UR STRIP-MCNC: NEGATIVE MG/DL
PROT UR STRIP-MCNC: NEGATIVE MG/DL

## 2020-08-26 PROCEDURE — 76819 FETAL BIOPHYS PROFIL W/O NST: CPT | Performed by: OBSTETRICS & GYNECOLOGY

## 2020-08-26 PROCEDURE — 99212 OFFICE O/P EST SF 10 MIN: CPT | Performed by: OBSTETRICS & GYNECOLOGY

## 2020-08-26 NOTE — PROGRESS NOTES
CC:  PRegnancy  Patient has no complaints.  She reports adequate fetal movement.  Blood pressure remains normal and BPP is 8 out of 8.  Patient is scheduled for repeat  in 1 week.  Labor precautions reviewed.  Preoperative instructions were discussed.  A/P: Supervision of pregnancy at 38 weeks with chronic hypertension and previous   --Repeat  scheduled in 1 week

## 2020-09-01 ENCOUNTER — HOSPITAL ENCOUNTER (INPATIENT)
Facility: HOSPITAL | Age: 30
LOS: 2 days | Discharge: HOME | End: 2020-09-03
Attending: OBSTETRICS & GYNECOLOGY | Admitting: OBSTETRICS & GYNECOLOGY

## 2020-09-01 ENCOUNTER — ANESTHESIA EVENT (OUTPATIENT)
Dept: LABOR AND DELIVERY | Facility: HOSPITAL | Age: 30
End: 2020-09-01

## 2020-09-01 ENCOUNTER — ANESTHESIA (OUTPATIENT)
Dept: LABOR AND DELIVERY | Facility: HOSPITAL | Age: 30
End: 2020-09-01

## 2020-09-01 DIAGNOSIS — Z30.2 REQUEST FOR STERILIZATION: ICD-10-CM

## 2020-09-01 DIAGNOSIS — Z98.891 PREVIOUS CESAREAN SECTION: ICD-10-CM

## 2020-09-01 DIAGNOSIS — Z98.891 S/P CESAREAN SECTION: Primary | ICD-10-CM

## 2020-09-01 PROBLEM — O34.219 PREVIOUS CESAREAN DELIVERY AFFECTING PREGNANCY: Status: ACTIVE | Noted: 2020-09-01

## 2020-09-01 LAB
ABO GROUP BLD: NORMAL
ALBUMIN SERPL-MCNC: 3.3 G/DL (ref 3.5–5.2)
ALBUMIN/GLOB SERPL: 1 G/DL
ALP SERPL-CCNC: 114 U/L (ref 39–117)
ALT SERPL W P-5'-P-CCNC: 12 U/L (ref 1–33)
AMPHET+METHAMPHET UR QL: NEGATIVE
ANION GAP SERPL CALCULATED.3IONS-SCNC: 7.8 MMOL/L (ref 5–15)
AST SERPL-CCNC: 14 U/L (ref 1–32)
BARBITURATES UR QL SCN: NEGATIVE
BENZODIAZ UR QL SCN: NEGATIVE
BILIRUB SERPL-MCNC: 0.4 MG/DL (ref 0–1.2)
BLD GP AB SCN SERPL QL: NEGATIVE
BUN SERPL-MCNC: 6 MG/DL (ref 6–20)
BUN/CREAT SERPL: 11.5 (ref 7–25)
CALCIUM SPEC-SCNC: 8.8 MG/DL (ref 8.6–10.5)
CANNABINOIDS SERPL QL: NEGATIVE
CHLORIDE SERPL-SCNC: 108 MMOL/L (ref 98–107)
CO2 SERPL-SCNC: 21.2 MMOL/L (ref 22–29)
COCAINE UR QL: NEGATIVE
CREAT SERPL-MCNC: 0.52 MG/DL (ref 0.57–1)
DEPRECATED RDW RBC AUTO: 39.1 FL (ref 37–54)
ERYTHROCYTE [DISTWIDTH] IN BLOOD BY AUTOMATED COUNT: 12.2 % (ref 12.3–15.4)
EXPIRATION DATE: NORMAL
GFR SERPL CREATININE-BSD FRML MDRD: 138 ML/MIN/1.73
GLOBULIN UR ELPH-MCNC: 3.2 GM/DL
GLUCOSE SERPL-MCNC: 87 MG/DL (ref 65–99)
HCT VFR BLD AUTO: 37.6 % (ref 34–46.6)
HGB BLD-MCNC: 12.8 G/DL (ref 12–15.9)
Lab: NORMAL
MCH RBC QN AUTO: 30.5 PG (ref 26.6–33)
MCHC RBC AUTO-ENTMCNC: 34 G/DL (ref 31.5–35.7)
MCV RBC AUTO: 89.5 FL (ref 79–97)
METHADONE UR QL SCN: NEGATIVE
OPIATES UR QL: NEGATIVE
OXYCODONE UR QL SCN: NEGATIVE
PLATELET # BLD AUTO: 282 10*3/MM3 (ref 140–450)
PMV BLD AUTO: 10.9 FL (ref 6–12)
POTASSIUM SERPL-SCNC: 4 MMOL/L (ref 3.5–5.2)
PROT SERPL-MCNC: 6.5 G/DL (ref 6–8.5)
PROT UR STRIP-MCNC: NEGATIVE MG/DL
RBC # BLD AUTO: 4.2 10*6/MM3 (ref 3.77–5.28)
RH BLD: POSITIVE
SARS-COV-2 RDRP RESP QL NAA+PROBE: NOT DETECTED
SODIUM SERPL-SCNC: 137 MMOL/L (ref 136–145)
T&S EXPIRATION DATE: NORMAL
WBC # BLD AUTO: 8.04 10*3/MM3 (ref 3.4–10.8)

## 2020-09-01 PROCEDURE — 25010000002 PHENYLEPHRINE PER 1 ML: Performed by: NURSE ANESTHETIST, CERTIFIED REGISTERED

## 2020-09-01 PROCEDURE — 25010000002 ONDANSETRON PER 1 MG: Performed by: ANESTHESIOLOGY

## 2020-09-01 PROCEDURE — 85027 COMPLETE CBC AUTOMATED: CPT | Performed by: OBSTETRICS & GYNECOLOGY

## 2020-09-01 PROCEDURE — 81002 URINALYSIS NONAUTO W/O SCOPE: CPT | Performed by: OBSTETRICS & GYNECOLOGY

## 2020-09-01 PROCEDURE — 80307 DRUG TEST PRSMV CHEM ANLYZR: CPT | Performed by: OBSTETRICS & GYNECOLOGY

## 2020-09-01 PROCEDURE — 86901 BLOOD TYPING SEROLOGIC RH(D): CPT | Performed by: OBSTETRICS & GYNECOLOGY

## 2020-09-01 PROCEDURE — 59514 CESAREAN DELIVERY ONLY: CPT | Performed by: OBSTETRICS & GYNECOLOGY

## 2020-09-01 PROCEDURE — 58611 LIGATE OVIDUCT(S) ADD-ON: CPT | Performed by: OBSTETRICS & GYNECOLOGY

## 2020-09-01 PROCEDURE — 25010000002 MORPHINE PER 10 MG: Performed by: ANESTHESIOLOGY

## 2020-09-01 PROCEDURE — 0UB70ZZ EXCISION OF BILATERAL FALLOPIAN TUBES, OPEN APPROACH: ICD-10-PCS | Performed by: OBSTETRICS & GYNECOLOGY

## 2020-09-01 PROCEDURE — 86900 BLOOD TYPING SEROLOGIC ABO: CPT | Performed by: OBSTETRICS & GYNECOLOGY

## 2020-09-01 PROCEDURE — 88302 TISSUE EXAM BY PATHOLOGIST: CPT | Performed by: OBSTETRICS & GYNECOLOGY

## 2020-09-01 PROCEDURE — 80053 COMPREHEN METABOLIC PANEL: CPT | Performed by: OBSTETRICS & GYNECOLOGY

## 2020-09-01 PROCEDURE — 87635 SARS-COV-2 COVID-19 AMP PRB: CPT | Performed by: OBSTETRICS & GYNECOLOGY

## 2020-09-01 PROCEDURE — 25010000002 HYDROMORPHONE PER 4 MG: Performed by: NURSE ANESTHETIST, CERTIFIED REGISTERED

## 2020-09-01 PROCEDURE — 88307 TISSUE EXAM BY PATHOLOGIST: CPT

## 2020-09-01 PROCEDURE — 25010000002 GENTAMICIN PER 80 MG: Performed by: OBSTETRICS & GYNECOLOGY

## 2020-09-01 PROCEDURE — 86850 RBC ANTIBODY SCREEN: CPT | Performed by: OBSTETRICS & GYNECOLOGY

## 2020-09-01 PROCEDURE — 25010000002 KETOROLAC TROMETHAMINE PER 15 MG: Performed by: NURSE ANESTHETIST, CERTIFIED REGISTERED

## 2020-09-01 RX ORDER — LIDOCAINE HYDROCHLORIDE 10 MG/ML
5 INJECTION, SOLUTION EPIDURAL; INFILTRATION; INTRACAUDAL; PERINEURAL AS NEEDED
Status: DISCONTINUED | OUTPATIENT
Start: 2020-09-01 | End: 2020-09-01 | Stop reason: HOSPADM

## 2020-09-01 RX ORDER — HYDROCORTISONE 25 MG/G
CREAM TOPICAL 3 TIMES DAILY PRN
Status: DISCONTINUED | OUTPATIENT
Start: 2020-09-01 | End: 2020-09-03 | Stop reason: HOSPADM

## 2020-09-01 RX ORDER — PHYTONADIONE 1 MG/.5ML
INJECTION, EMULSION INTRAMUSCULAR; INTRAVENOUS; SUBCUTANEOUS
Status: DISPENSED
Start: 2020-09-01 | End: 2020-09-01

## 2020-09-01 RX ORDER — MISOPROSTOL 200 UG/1
800 TABLET ORAL AS NEEDED
Status: DISCONTINUED | OUTPATIENT
Start: 2020-09-01 | End: 2020-09-01 | Stop reason: HOSPADM

## 2020-09-01 RX ORDER — PRENATAL VIT/IRON FUM/FOLIC AC 27MG-0.8MG
1 TABLET ORAL DAILY
Status: DISCONTINUED | OUTPATIENT
Start: 2020-09-01 | End: 2020-09-03 | Stop reason: HOSPADM

## 2020-09-01 RX ORDER — ONDANSETRON 2 MG/ML
4 INJECTION INTRAMUSCULAR; INTRAVENOUS ONCE AS NEEDED
Status: COMPLETED | OUTPATIENT
Start: 2020-09-01 | End: 2020-09-01

## 2020-09-01 RX ORDER — DIPHENHYDRAMINE HCL 25 MG
25 CAPSULE ORAL EVERY 4 HOURS PRN
Status: DISCONTINUED | OUTPATIENT
Start: 2020-09-01 | End: 2020-09-03 | Stop reason: HOSPADM

## 2020-09-01 RX ORDER — DIPHENHYDRAMINE HYDROCHLORIDE 50 MG/ML
25 INJECTION INTRAMUSCULAR; INTRAVENOUS EVERY 4 HOURS PRN
Status: DISCONTINUED | OUTPATIENT
Start: 2020-09-01 | End: 2020-09-03 | Stop reason: HOSPADM

## 2020-09-01 RX ORDER — FAMOTIDINE 10 MG/ML
20 INJECTION, SOLUTION INTRAVENOUS ONCE AS NEEDED
Status: COMPLETED | OUTPATIENT
Start: 2020-09-01 | End: 2020-09-01

## 2020-09-01 RX ORDER — ONDANSETRON 4 MG/1
4 TABLET, FILM COATED ORAL EVERY 8 HOURS PRN
Status: DISCONTINUED | OUTPATIENT
Start: 2020-09-01 | End: 2020-09-03 | Stop reason: HOSPADM

## 2020-09-01 RX ORDER — ONDANSETRON 2 MG/ML
4 INJECTION INTRAMUSCULAR; INTRAVENOUS EVERY 6 HOURS PRN
Status: DISCONTINUED | OUTPATIENT
Start: 2020-09-01 | End: 2020-09-03 | Stop reason: HOSPADM

## 2020-09-01 RX ORDER — BUPIVACAINE HYDROCHLORIDE 7.5 MG/ML
INJECTION, SOLUTION EPIDURAL; RETROBULBAR
Status: COMPLETED | OUTPATIENT
Start: 2020-09-01 | End: 2020-09-01

## 2020-09-01 RX ORDER — ERYTHROMYCIN 5 MG/G
OINTMENT OPHTHALMIC
Status: DISPENSED
Start: 2020-09-01 | End: 2020-09-01

## 2020-09-01 RX ORDER — OXYTOCIN-SODIUM CHLORIDE 0.9% IV SOLN 30 UNIT/500ML 30-0.9/5 UT/ML-%
250 SOLUTION INTRAVENOUS CONTINUOUS
Status: DISPENSED | OUTPATIENT
Start: 2020-09-01 | End: 2020-09-01

## 2020-09-01 RX ORDER — MORPHINE SULFATE 1 MG/ML
INJECTION, SOLUTION EPIDURAL; INTRATHECAL; INTRAVENOUS
Status: COMPLETED | OUTPATIENT
Start: 2020-09-01 | End: 2020-09-01

## 2020-09-01 RX ORDER — NALOXONE HCL 0.4 MG/ML
0.2 VIAL (ML) INJECTION
Status: DISCONTINUED | OUTPATIENT
Start: 2020-09-01 | End: 2020-09-03 | Stop reason: HOSPADM

## 2020-09-01 RX ORDER — ACETAMINOPHEN 500 MG
1000 TABLET ORAL ONCE
Status: COMPLETED | OUTPATIENT
Start: 2020-09-01 | End: 2020-09-01

## 2020-09-01 RX ORDER — CLINDAMYCIN PHOSPHATE 900 MG/50ML
900 INJECTION INTRAVENOUS ONCE
Status: COMPLETED | OUTPATIENT
Start: 2020-09-01 | End: 2020-09-01

## 2020-09-01 RX ORDER — SODIUM CHLORIDE 0.9 % (FLUSH) 0.9 %
3 SYRINGE (ML) INJECTION EVERY 12 HOURS SCHEDULED
Status: DISCONTINUED | OUTPATIENT
Start: 2020-09-01 | End: 2020-09-01 | Stop reason: HOSPADM

## 2020-09-01 RX ORDER — ONDANSETRON 2 MG/ML
4 INJECTION INTRAMUSCULAR; INTRAVENOUS ONCE AS NEEDED
Status: DISCONTINUED | OUTPATIENT
Start: 2020-09-01 | End: 2020-09-03 | Stop reason: HOSPADM

## 2020-09-01 RX ORDER — HYDROXYZINE 50 MG/1
50 TABLET, FILM COATED ORAL EVERY 6 HOURS PRN
Status: DISCONTINUED | OUTPATIENT
Start: 2020-09-01 | End: 2020-09-03 | Stop reason: HOSPADM

## 2020-09-01 RX ORDER — EPHEDRINE SULFATE 50 MG/ML
INJECTION, SOLUTION INTRAVENOUS AS NEEDED
Status: DISCONTINUED | OUTPATIENT
Start: 2020-09-01 | End: 2020-09-01 | Stop reason: SURG

## 2020-09-01 RX ORDER — OXYTOCIN-SODIUM CHLORIDE 0.9% IV SOLN 30 UNIT/500ML 30-0.9/5 UT/ML-%
999 SOLUTION INTRAVENOUS ONCE
Status: COMPLETED | OUTPATIENT
Start: 2020-09-01 | End: 2020-09-01

## 2020-09-01 RX ORDER — CARBOPROST TROMETHAMINE 250 UG/ML
250 INJECTION, SOLUTION INTRAMUSCULAR AS NEEDED
Status: DISCONTINUED | OUTPATIENT
Start: 2020-09-01 | End: 2020-09-01 | Stop reason: HOSPADM

## 2020-09-01 RX ORDER — KETOROLAC TROMETHAMINE 30 MG/ML
INJECTION, SOLUTION INTRAMUSCULAR; INTRAVENOUS AS NEEDED
Status: DISCONTINUED | OUTPATIENT
Start: 2020-09-01 | End: 2020-09-01 | Stop reason: SURG

## 2020-09-01 RX ORDER — OXYTOCIN-SODIUM CHLORIDE 0.9% IV SOLN 30 UNIT/500ML 30-0.9/5 UT/ML-%
125 SOLUTION INTRAVENOUS CONTINUOUS PRN
Status: COMPLETED | OUTPATIENT
Start: 2020-09-01 | End: 2020-09-01

## 2020-09-01 RX ORDER — IBUPROFEN 800 MG/1
800 TABLET ORAL EVERY 8 HOURS PRN
Status: DISCONTINUED | OUTPATIENT
Start: 2020-09-01 | End: 2020-09-03 | Stop reason: HOSPADM

## 2020-09-01 RX ORDER — PROMETHAZINE HYDROCHLORIDE 12.5 MG/1
12.5 TABLET ORAL EVERY 4 HOURS PRN
Status: DISCONTINUED | OUTPATIENT
Start: 2020-09-01 | End: 2020-09-03 | Stop reason: HOSPADM

## 2020-09-01 RX ORDER — HYDROMORPHONE HYDROCHLORIDE 1 MG/ML
0.5 INJECTION, SOLUTION INTRAMUSCULAR; INTRAVENOUS; SUBCUTANEOUS
Status: DISCONTINUED | OUTPATIENT
Start: 2020-09-01 | End: 2020-09-01 | Stop reason: HOSPADM

## 2020-09-01 RX ORDER — CALCIUM CARBONATE 200(500)MG
1 TABLET,CHEWABLE ORAL EVERY 6 HOURS PRN
Status: DISCONTINUED | OUTPATIENT
Start: 2020-09-01 | End: 2020-09-03 | Stop reason: HOSPADM

## 2020-09-01 RX ORDER — SODIUM CHLORIDE 0.9 % (FLUSH) 0.9 %
1-10 SYRINGE (ML) INJECTION AS NEEDED
Status: DISCONTINUED | OUTPATIENT
Start: 2020-09-01 | End: 2020-09-01 | Stop reason: HOSPADM

## 2020-09-01 RX ORDER — OXYCODONE HYDROCHLORIDE AND ACETAMINOPHEN 5; 325 MG/1; MG/1
1 TABLET ORAL EVERY 4 HOURS PRN
Status: DISCONTINUED | OUTPATIENT
Start: 2020-09-01 | End: 2020-09-03 | Stop reason: HOSPADM

## 2020-09-01 RX ORDER — METHYLERGONOVINE MALEATE 0.2 MG/ML
200 INJECTION INTRAVENOUS ONCE AS NEEDED
Status: DISCONTINUED | OUTPATIENT
Start: 2020-09-01 | End: 2020-09-01 | Stop reason: HOSPADM

## 2020-09-01 RX ORDER — SODIUM CHLORIDE, SODIUM LACTATE, POTASSIUM CHLORIDE, CALCIUM CHLORIDE 600; 310; 30; 20 MG/100ML; MG/100ML; MG/100ML; MG/100ML
125 INJECTION, SOLUTION INTRAVENOUS CONTINUOUS
Status: DISCONTINUED | OUTPATIENT
Start: 2020-09-01 | End: 2020-09-01

## 2020-09-01 RX ORDER — MORPHINE SULFATE 2 MG/ML
2 INJECTION, SOLUTION INTRAMUSCULAR; INTRAVENOUS
Status: ACTIVE | OUTPATIENT
Start: 2020-09-01 | End: 2020-09-02

## 2020-09-01 RX ADMIN — HYDROXYZINE HYDROCHLORIDE 50 MG: 50 TABLET ORAL at 12:12

## 2020-09-01 RX ADMIN — CLINDAMYCIN PHOSPHATE 900 MG: 900 INJECTION, SOLUTION INTRAVENOUS at 07:14

## 2020-09-01 RX ADMIN — EPHEDRINE SULFATE 10 MG: 50 INJECTION INTRAVENOUS at 07:47

## 2020-09-01 RX ADMIN — IBUPROFEN 800 MG: 800 TABLET ORAL at 12:12

## 2020-09-01 RX ADMIN — FAMOTIDINE 20 MG: 10 INJECTION INTRAVENOUS at 07:07

## 2020-09-01 RX ADMIN — ACETAMINOPHEN 1000 MG: 500 TABLET, FILM COATED ORAL at 07:06

## 2020-09-01 RX ADMIN — EPHEDRINE SULFATE 10 MG: 50 INJECTION INTRAVENOUS at 07:45

## 2020-09-01 RX ADMIN — GENTAMICIN SULFATE 600 MG: 40 INJECTION, SOLUTION INTRAMUSCULAR; INTRAVENOUS at 07:15

## 2020-09-01 RX ADMIN — BUPIVACAINE HYDROCHLORIDE 1.8 ML: 7.5 INJECTION, SOLUTION EPIDURAL; RETROBULBAR at 07:44

## 2020-09-01 RX ADMIN — KETOROLAC TROMETHAMINE 30 MG: 30 INJECTION, SOLUTION INTRAMUSCULAR; INTRAVENOUS at 08:33

## 2020-09-01 RX ADMIN — PHENYLEPHRINE HYDROCHLORIDE 100 MCG: 10 INJECTION INTRAVENOUS at 07:51

## 2020-09-01 RX ADMIN — OXYTOCIN 999 ML/HR: 10 INJECTION INTRAVENOUS at 08:04

## 2020-09-01 RX ADMIN — OXYCODONE HYDROCHLORIDE AND ACETAMINOPHEN 1 TABLET: 5; 325 TABLET ORAL at 21:43

## 2020-09-01 RX ADMIN — OXYCODONE HYDROCHLORIDE AND ACETAMINOPHEN 1 TABLET: 5; 325 TABLET ORAL at 12:12

## 2020-09-01 RX ADMIN — ONDANSETRON 4 MG: 2 INJECTION INTRAMUSCULAR; INTRAVENOUS at 07:10

## 2020-09-01 RX ADMIN — SODIUM CHLORIDE, POTASSIUM CHLORIDE, SODIUM LACTATE AND CALCIUM CHLORIDE 125 ML/HR: 600; 310; 30; 20 INJECTION, SOLUTION INTRAVENOUS at 06:32

## 2020-09-01 RX ADMIN — HYDROMORPHONE HYDROCHLORIDE 0.5 MG: 1 INJECTION, SOLUTION INTRAMUSCULAR; INTRAVENOUS; SUBCUTANEOUS at 10:04

## 2020-09-01 RX ADMIN — MORPHINE SULFATE 200 MCG: 1 INJECTION, SOLUTION EPIDURAL; INTRATHECAL; INTRAVENOUS at 07:44

## 2020-09-01 RX ADMIN — OXYCODONE HYDROCHLORIDE AND ACETAMINOPHEN 1 TABLET: 5; 325 TABLET ORAL at 16:53

## 2020-09-01 RX ADMIN — OXYTOCIN 125 ML/HR: 10 INJECTION INTRAVENOUS at 09:49

## 2020-09-01 RX ADMIN — EPHEDRINE SULFATE 10 MG: 50 INJECTION INTRAVENOUS at 07:51

## 2020-09-01 RX ADMIN — HYDROMORPHONE HYDROCHLORIDE 0.5 MG: 1 INJECTION, SOLUTION INTRAMUSCULAR; INTRAVENOUS; SUBCUTANEOUS at 10:33

## 2020-09-01 RX ADMIN — IBUPROFEN 800 MG: 800 TABLET ORAL at 20:18

## 2020-09-01 RX ADMIN — SODIUM CHLORIDE, POTASSIUM CHLORIDE, SODIUM LACTATE AND CALCIUM CHLORIDE 1000 ML: 600; 310; 30; 20 INJECTION, SOLUTION INTRAVENOUS at 05:26

## 2020-09-01 NOTE — H&P
"[facility]  Obstetric History and Physical    Chief Complaint   Patient presents with   • Scheduled      Pt. states here for scheduled C/S.  Pt. states positive FM and occassional contractions.  Pt. denies LOF and VB.  Pt. states \"some issues\" with BPs.       Subjective     Patient is a 29 y.o. female  currently at 39w3d, who presents for scheduled repeat  section with tubal.  Pregnancy complicated by chronic hypertension, well controlled on no meds.    The following portions of the patients history were reviewed and updated as appropriate: current medications, allergies, past medical history, past surgical history, past family history, past social history and problem list .       Prenatal Information:  Prenatal Results     POC Urine Glucose/Protein     Test Value Reference Range Date Time    Urine Glucose Negative  Negative, 1000 mg/dL (3+) 20     Urine Protein Negative  Negative 20           Initial Prenatal Labs     Test Value Reference Range Date Time    Hemoglobin 13.2 g/dL 11.1 - 15.9 20 1407    Hematocrit 38.1 % 34.0 - 46.6 20 1407    Platelets 282 10*3/mm3 140 - 450 20 0530      303 10*3/mm3 140 - 450 20 1052      336 10*3/mm3 140 - 450 20 1211      397 x10E3/uL 150 - 450 20 1407    Rubella IgG 3.22 index Immune >0.99 20 1407    Hepatitis B SAg Negative  Negative 20 1407    Hepatitis C Ab <0.1 s/co ratio 0.0 - 0.9 20 1407    RPR Reactive  Non Reactive 20 1407    ABO O   20 0530    Rh Positive   20 0530    Antibody Screen Negative  Negative 20 1407    HIV Non Reactive  Non Reactive 20 1407    Urine Culture Final report   20 1512    Gonorrhea Negative  Negative 20 1510    Chlamydia Negative  Negative 20 1510    TSH 1.430 u(iU)/mL 0.27 - 4.20 18 1428          2nd and 3rd Trimester     Test Value Reference Range Date Time    Hemoglobin (repeated) 12.8 g/dL 12.0 - 15.9 " 09/01/20 0530      12.5 g/dL 12.0 - 15.9 07/27/20 1052      12.1 g/dL 12.0 - 15.9 06/12/20 1211    Hematocrit (repeated) 37.6 % 34.0 - 46.6 09/01/20 0530      38.0 % 34.0 - 46.6 07/27/20 1052      36.3 % 34.0 - 46.6 06/12/20 1211     mg/dL 65 - 179 06/12/20 1210    Antibody Screen (repeated) Negative   09/01/20 0530    GTT Fasting        GTT 1 Hr        GTT 2 Hr        GTT 3 Hr        Group B Strep Negative  Negative 08/12/20 1455          Drug Screening     Test Value Reference Range Date Time    Amphetamine Screen        Barbiturate Screen        Benzodiazepine Screen        Methadone Screen        Phencyclidine Screen        Opiates Screen        THC Screen        Cocaine Screen        Propoxyphene Screen        Buprenorphine Screen        Methamphetamine Screen        Oxycodone Screen        Tricyclic Antidepressants Screen              Other (Risk screening)     Test Value Reference Range Date Time    Varicella IgG        Parvovirus IgG        CMV IgG        Cystic Fibrosis        Hemoglobin electrophoresis        NIPT        MSAFP-4        AFP (for NTD only)                  External Prenatal Results     Pregnancy Outside Results - Transcribed From Office Records - See Scanned Records For Details     Test Value Date Time    Hgb 12.8 g/dL 09/01/20 0530      12.5 g/dL 07/27/20 1052      12.1 g/dL 06/12/20 1211      13.2 g/dL 01/31/20 1407    Hct 37.6 % 09/01/20 0530      38.0 % 07/27/20 1052      36.3 % 06/12/20 1211      38.1 % 01/31/20 1407    ABO O  09/01/20 0530    Rh Positive  09/01/20 0530    Antibody Screen Negative  09/01/20 0530      Negative  01/31/20 1407    Glucose Fasting GTT       Glucose Tolerance Test 1 hour       Glucose Tolerance Test 3 hour       Gonorrhea (discrete) Negative  01/31/20 1510    Chlamydia (discrete) Negative  01/31/20 1510    RPR Reactive  01/31/20 1407    VDRL       Syphilis Antibody Non Reactive  01/31/20 1407    Rubella 3.22 index 01/31/20 1407    HBsAg Negative   20 1407    Herpes Simplex Virus PCR       Herpes Simplex VIrus Culture       HIV Non Reactive  20 1407    Hep C RNA Quant PCR       Hep C Antibody <0.1 s/co ratio 20 1407    AFP       Group B Strep Negative  20 1455    GBS Susceptibility to Clindamycin       GBS Susceptibility to Erythromycin       Fetal Fibronectin       Genetic Testing, Maternal Blood             Drug Screening     Test Value Date Time    Urine Drug Screen       Amphetamine Screen       Barbiturate Screen       Benzodiazepine Screen       Methadone Screen       Phencyclidine Screen       Opiates Screen       THC Screen       Cocaine Screen       Propoxyphene Screen       Buprenorphine Screen       Methamphetamine Screen       Oxycodone Screen       Tricyclic Antidepressants Screen                    Past OB History:     OB History    Para Term  AB Living   2 1 1 0 0 1   SAB TAB Ectopic Molar Multiple Live Births   0 0 0 0 0 0      # Outcome Date GA Lbr Jeronimo/2nd Weight Sex Delivery Anes PTL Lv   2 Current            1 Term 08/12/10 41w0d  4508 g (9 lb 15 oz)  CS-LTranv         Complications: Fetal distress affecting delivery       Past Medical History: Past Medical History:   Diagnosis Date   • ADD (attention deficit disorder)    • Gestational hypertension    • Varicella       Past Surgical History Past Surgical History:   Procedure Laterality Date   • BILE DUCT STENT PLACEMENT      Subsequent removal   •  SECTION     • WISDOM TOOTH EXTRACTION        Family History: Family History   Problem Relation Age of Onset   • Lung cancer Maternal Grandmother    • Cervical cancer Maternal Aunt    • Ovarian cancer Maternal Aunt    • Stroke Mother    • Stroke Paternal Grandmother       Social History:  reports that she has quit smoking. Her smoking use included cigarettes. She has a 2.64 pack-year smoking history. She has never used smokeless tobacco.   reports that she drank alcohol.   reports that she has current  or past drug history. Drug: Marijuana.        General ROS: Pertinent items are noted in HPI, all other systems reviewed and negative    Objective       Vital Signs Range for the last 24 hours  Temperature: Temp:  [98 °F (36.7 °C)-98.8 °F (37.1 °C)] 98 °F (36.7 °C)   Temp Source: Temp src: Oral   BP: BP: (125)/(89) 125/89   Pulse: Heart Rate:  [91] 91   Respirations: Resp:  [18] 18   SPO2:     O2 Amount (l/min):     O2 Devices     Weight: Weight:  [105 kg (232 lb 6.4 oz)] 105 kg (232 lb 6.4 oz)     Physical Examination: General appearance - alert, well appearing, and in no distress  Chest - clear to auscultation, no wheezes, rales or rhonchi, symmetric air entry  Heart - normal rate and regular rhythm  Abdomen - gravid, nontender, EFW 10#  Extremities - pedal edema 1 +    Presentation: vertex   Cervix: Exam by:     Dilation:     Effacement:     Station:         Fetal Heart Rate Assessment   Method: Fetal HR Assessment Method: external   Beats/min: Fetal HR (beats/min): 125   Baseline: Fetal Heart Baseline Rate: normal range   Variability: Fetal HR Variability: moderate (amplitude range 6 to 25 bpm)   Accels: Fetal HR Accelerations: greater than/equal to 15 bpm, lasting at least 15 seconds   Decels: Fetal HR Decelerations: absent   Tracing Category:       Uterine Assessment   Method: Method: external tocotransducer   Frequency (min): Contraction Frequency (Minutes): x1   Ctx Count in 10 min:     Duration:     Intensity: Contraction Intensity: mild by palpation   Intensity by IUPC:     Resting Tone: Uterine Resting Tone: soft by palpation   Resting Tone by IUPC:     [place] Units:       Assessment/Plan       Previous  delivery affecting pregnancy    Chronic hypertension affecting pregnancy    Previous  section        Assessment:  1.  Intrauterine pregnancy at 39w3d gestation with reassuring fetal status.    2.  Previous  section, desires repeat.  3.  Obstetrical history significant for  chronic hypertension, well controlled on no meds.  4.  GBS status:   Strep Gp B BALJINDER   Date Value Ref Range Status   2020 Negative Negative Final     Comment:     Centers for Disease Control and Prevention (CDC) and American Congress  of Obstetricians and Gynecologists (ACOG) guidelines for prevention of   group B streptococcal (GBS) disease specify co-collection of  a vaginal and rectal swab specimen to maximize sensitivity of GBS  detection. Per the CDC and ACOG, swabbing both the lower vagina and  rectum substantially increases the yield of detection compared with  sampling the vagina alone.  Penicillin G, ampicillin, or cefazolin are indicated for intrapartum  prophylaxis of  GBS colonization. Reflex susceptibility  testing should be performed prior to use of clindamycin only on GBS  isolates from penicillin-allergic women who are considered a high risk  for anaphylaxis. Treatment with vancomycin without additional testing  is warranted if resistance to clindamycin is noted.         Plan:  1.  Plan is to proceed with repeat  section with tubal ligation.  Patient understands risks of  section including risk of infection, bleeding, damage to surrounding structures, need for additional surgery if injury occurs, risk of anesthesia, blood clots, heart attack, and stroke.  She also understands risks of tubal ligation including risk of failure, ectopic pregnancy, and regret.  She wishes to proceed with repeat  section with tubal ligation.  All consents forms are signed.  2. Plan of care has been reviewed with patient and   3.  Risks, benefits of treatment plan have been discussed.  4.  All questions have been answered.      Kacy Mancilla MD  2020  07:34

## 2020-09-01 NOTE — NURSING NOTE
Pt being monitored after spinal. She was feeling sick and had low BPs. FHTs were in the 90s for 3 minutes. Dr. Mancilla at patients bedside. Dr. Mancilla was asked if she wanted us to continue with our normal prep or splash with betadine. She said to take the monitor off and proceed with normal skin prep.

## 2020-09-01 NOTE — ANESTHESIA PROCEDURE NOTES
Spinal Block      Patient location during procedure: OB  Start Time: 9/1/2020 7:40 AM  Stop Time: 9/1/2020 7:44 AM  Indication:at surgeon's request and procedure for pain  Performed By  CRNA: Caroline Matute CRNA  Preanesthetic Checklist  Completed: patient identified, site marked, surgical consent, pre-op evaluation, timeout performed, IV checked, risks and benefits discussed and monitors and equipment checked  Spinal Block Prep:  Patient Position:sitting  Sterile Tech:cap, gloves, mask and sterile barriers  Prep:Chloraprep  Spinal Block Procedure  Approach:midline  Location:L4-L5  Needle Type:Juanjose  Needle Gauge:25 G  Placement of Spinal needle event:cerebrospinal fluid aspirated  Paresthesia: no  Fluid Appearance:clear  Medications: Morphine PF injection, 200 mcg  bupivacaine PF (MARCAINE) 0.75 % injection, 1.8 mL  Med Administered at 9/1/2020 7:44 AM   Post Assessment  Patient Tolerance:patient tolerated the procedure well with no apparent complications  Complications no

## 2020-09-01 NOTE — LACTATION NOTE
Baby is currently latching some. Educated mom on hand expression and she is able to get drops of colostrum out to put in baby's mouth. Parents deny questions at this time. Encouraged to call LC as needed. Mom had personal pump    Lactation Consult Note    Evaluation Completed: 2020 13:03  Patient Name: Lin Harp  :  1990  MRN:  [id number]     REFERRAL  INFORMATION:                                         DELIVERY HISTORY:          Skin to skin initiation date/time: 2020  8:17 AM   Skin to skin end date/time: 2020  10:45 AM         MATERNAL ASSESSMENT:                               INFANT ASSESSMENT:  Information for the patient's :  Montse Harp [[id number]]   No past medical history on file.                                                                                                                                MATERNAL INFANT FEEDING:                                                                       EQUIPMENT TYPE:                                 BREAST PUMPING:          LACTATION REFERRALS:

## 2020-09-01 NOTE — PLAN OF CARE
Problem: Patient Care Overview  Goal: Plan of Care Review  Outcome: Ongoing (interventions implemented as appropriate)  Flowsheets (Taken 2020 1138)  Progress: improving  Plan of Care Reviewed With: patient; spouse  Outcome Summary: Admittted for Scheduled C/S and BTL this 28 y/o  (now) at 39 3/7 wks gestation pt. of Dr. Mancilla.  C/S delivery with BTL, Spinal anesthesia and delivery of viable male infant,  cc.  JEANNETTE in the OR, Breastfeed infant confidently in OBRR, Dilaudid 1 mg IVP given in OBRR-pain at 2/10 at d/c to PP room.  Goal: Individualization and Mutuality  Outcome: Ongoing (interventions implemented as appropriate)  Flowsheets (Taken 2020 1138)  Patient Specific Goals (Include Timeframe): Comfortable delivery and OB Recovery period, Initiate bonding  How to Address Anxieties/Fears: Explain procedures and POC  Patient Specific Interventions: Pain management and assist with infant as needed  How Would You and/or Your Support Person Like to Participate in Your Care?: Participate in decision making  What Anxieties, Fears, Concerns, or Questions Do You Have About Your Care?: Surgery  Patient Specific Preferences: C/S, BTL, JEANNETTE, Breastfeeding  Goal: Discharge Needs Assessment  Outcome: Ongoing (interventions implemented as appropriate)  Goal: Interprofessional Rounds/Family Conf  Outcome: Ongoing (interventions implemented as appropriate)     Problem: Anesthesia/Analgesia, Neuraxial (Obstetrics)  Goal: Signs and Symptoms of Listed Potential Problems Will be Absent, Minimized or Managed (Anesthesia/Analgesia, Neuraxial)  Outcome: Ongoing (interventions implemented as appropriate)     Problem: Fall Risk,  (Adult,Obstetrics,Pediatric)  Goal: Identify Related Risk Factors and Signs and Symptoms  Outcome: Ongoing (interventions implemented as appropriate)  Goal: Absence of Maternal Fall  Outcome: Ongoing (interventions implemented as appropriate)  Goal: Absence of   Fall/Drop  Outcome: Ongoing (interventions implemented as appropriate)     Problem: Postpartum ( Delivery) (Adult,Obstetrics,Pediatric)  Goal: Signs and Symptoms of Listed Potential Problems Will be Absent, Minimized or Managed (Postpartum)  Outcome: Ongoing (interventions implemented as appropriate)  Goal: Anesthesia/Sedation Recovery  Outcome: Ongoing (interventions implemented as appropriate)     Problem: Breastfeeding (Adult,Obstetrics,Pediatric)  Goal: Signs and Symptoms of Listed Potential Problems Will be Absent, Minimized or Managed (Breastfeeding)  Outcome: Ongoing (interventions implemented as appropriate)     Problem: Skin Injury Risk (Adult)  Goal: Identify Related Risk Factors and Signs and Symptoms  Outcome: Ongoing (interventions implemented as appropriate)  Goal: Skin Health and Integrity  Outcome: Ongoing (interventions implemented as appropriate)

## 2020-09-01 NOTE — OP NOTE
Repeat low transverse  section and bilateral fimbriectomy procedure Note    Indications: previous  section low transverse    Pre-operative Diagnosis: 1. 39 week 3 day pregnancy.  2. Previous  section  3. Desires repeat  section  4. Chronic hypertension  5. Desires permanent sterilization    Post-operative Diagnosis: same    Surgeon: Kacy Mancilla MD     Assistants: Matthew Nash MD   was responsible for performing the following activities: Retraction, Suction and Irrigation and their skilled assistance was necessary for the success of this case.      Anesthesia: Spinal anesthesia      Procedure Details   The patient was seen in the Holding Room. The risks, benefits, complications, treatment options, and expected outcomes were discussed with the patient.  The patient concurred with the proposed plan, giving informed consent.  The site of surgery properly noted/marked. The patient was taken to Operating Room # 1, identified as Lin Harp and the procedure verified as  Delivery. A Time Out was held and the above information confirmed.    After induction of anesthesia, the patient was draped and prepped in the usual sterile manner. A Pfannenstiel incision was made and carried down through the subcutaneous tissue to the fascia. Fascial incision was made and extended transversely. The fascia was  from the underlying rectus tissue superiorly and inferiorly. The peritoneum was identified and entered. Peritoneal incision was extended longitudinally. The utero-vesical peritoneal reflection was incised transversely and the bladder flap was bluntly freed from the lower uterine segment. A low transverse uterine incision was made. Delivered from vertex presentation was a 4380 gram male with Apgar scores of 8 at one minute and 9 at five minutes. After the umbilical cord was clamped and cut cord blood was obtained for evaluation. The placenta was removed intact and appeared  normal.  Uterus was exteriorized.  The uterine outline, tubes and ovaries appeared normal. The uterine incision was closed with running locked sutures of 0 Vicryl.  A second imbricating layer was placed with 0 Vicryl in a running fashion.  Several figure-of-eight stitches of 0 Vicryl were placed at the hysterotomy for further hemostasis.  Hemostasis was observed.  Attention was turned to the sterilization portion of the procedure.  The right fallopian tube was followed out to the fimbria and grasped with a Webster clamp.  A Denise was placed underneath the right fallopian tube and the fallopian tube was excised from the mid ampullary portion through the fimbria along the underlying mesosalpinx.  The remaining segment was double ligated with 0 plain gut.  This was repeated with the left fallopian tube.  Lavage was carried out until clear.  Uterus was returned to the patient's abdomen.  Hysterotomy was revisualized and was hemostatic.  Both tubal segments were also revisualized and remained hemostatic.  The peritoneum was reapproximated with 3-0 Vicryl in a running fashion.  The fascia was then reapproximated with running sutures of 0 Vicryl.  The subcutaneous layer was reapproximated with 3-0 Vicryl in a running fashion.  The skin was reapproximated with 4-0 Monocryl and Dermabond.    Instrument, sponge, and needle counts were correct prior the abdominal closure and at the conclusion of the case.     Findings:  Normal pelvic anatomy    Estimated Blood Loss:  800 mL           Drains: [place], draining clear urine           Specimens: Placenta, to path           Implants: None           Complications:  None; patient tolerated the procedure well.           Disposition: PACU - hemodynamically stable.           Condition: stable    Attending Attestation: I was present and scrubbed for the entire procedure.

## 2020-09-01 NOTE — ANESTHESIA POSTPROCEDURE EVALUATION
"Patient: Lin Harp    Procedure Summary     Date:  20 Room / Location:   [facility]    Anesthesia Start:  738 Anesthesia Stop:  902    Procedure:   SECTION REPEAT WITH TUBAL (N/A Abdomen) Diagnosis:       Previous  section      (Previous  section [Z98.891])    Surgeon:  Kacy Mancilla MD Provider:  Rolando Espinal MD    Anesthesia Type:  spinal ASA Status:  2          Anesthesia Type: spinal    Vitals  Vitals Value Taken Time   /69 2020  9:16 AM   Temp 36.6 °C (97.9 °F) 2020  9:01 AM   Pulse 77 2020  9:30 AM   Resp 20 2020  9:16 AM   SpO2 98 % 2020  9:30 AM   Vitals shown include unvalidated device data.        Post Anesthesia Care and Evaluation    Patient location during evaluation: bedside  Patient participation: complete - patient participated  Level of consciousness: awake and alert  Pain management: adequate  Airway patency: patent  Anesthetic complications: No anesthetic complications    Cardiovascular status: acceptable  Respiratory status: acceptable  Hydration status: acceptable    Comments: /69 (BP Location: Right arm, Patient Position: Lying)   Pulse 74   Temp 36.6 °C (97.9 °F) (Oral)   Resp 20   Ht 180.3 cm (71\")   Wt 105 kg (232 lb 6.4 oz)   LMP 2019 (Exact Date)   SpO2 98%   Breastfeeding Yes   BMI 32.41 kg/m²       "

## 2020-09-01 NOTE — ANESTHESIA PREPROCEDURE EVALUATION
Anesthesia Evaluation     no history of anesthetic complications:  NPO Solid Status: > 8 hours             Airway   Mallampati: II  TM distance: >3 FB  Neck ROM: full  No difficulty expected  Dental - normal exam     Pulmonary - negative pulmonary ROS and normal exam   Cardiovascular     Rhythm: regular    (+) hypertension,       Neuro/Psych  GI/Hepatic/Renal/Endo      Musculoskeletal     Abdominal    Substance History      OB/GYN    (+) pregnancy induced hypertension        Other                        Anesthesia Plan    ASA 2     spinal

## 2020-09-02 LAB
BASOPHILS # BLD AUTO: 0.04 10*3/MM3 (ref 0–0.2)
BASOPHILS NFR BLD AUTO: 0.4 % (ref 0–1.5)
CYTO UR: NORMAL
DEPRECATED RDW RBC AUTO: 41.4 FL (ref 37–54)
EOSINOPHIL # BLD AUTO: 0.26 10*3/MM3 (ref 0–0.4)
EOSINOPHIL NFR BLD AUTO: 2.3 % (ref 0.3–6.2)
ERYTHROCYTE [DISTWIDTH] IN BLOOD BY AUTOMATED COUNT: 12.3 % (ref 12.3–15.4)
HCT VFR BLD AUTO: 27.7 % (ref 34–46.6)
HGB BLD-MCNC: 9.4 G/DL (ref 12–15.9)
IMM GRANULOCYTES # BLD AUTO: 0.03 10*3/MM3 (ref 0–0.05)
IMM GRANULOCYTES NFR BLD AUTO: 0.3 % (ref 0–0.5)
LAB AP CASE REPORT: NORMAL
LYMPHOCYTES # BLD AUTO: 2.15 10*3/MM3 (ref 0.7–3.1)
LYMPHOCYTES NFR BLD AUTO: 18.9 % (ref 19.6–45.3)
MCH RBC QN AUTO: 31.3 PG (ref 26.6–33)
MCHC RBC AUTO-ENTMCNC: 33.9 G/DL (ref 31.5–35.7)
MCV RBC AUTO: 92.3 FL (ref 79–97)
MONOCYTES # BLD AUTO: 1.17 10*3/MM3 (ref 0.1–0.9)
MONOCYTES NFR BLD AUTO: 10.3 % (ref 5–12)
NEUTROPHILS NFR BLD AUTO: 67.8 % (ref 42.7–76)
NEUTROPHILS NFR BLD AUTO: 7.7 10*3/MM3 (ref 1.7–7)
NRBC BLD AUTO-RTO: 0 /100 WBC (ref 0–0.2)
PATH REPORT.FINAL DX SPEC: NORMAL
PATH REPORT.GROSS SPEC: NORMAL
PLATELET # BLD AUTO: 217 10*3/MM3 (ref 140–450)
PMV BLD AUTO: 10.5 FL (ref 6–12)
RBC # BLD AUTO: 3 10*6/MM3 (ref 3.77–5.28)
WBC # BLD AUTO: 11.35 10*3/MM3 (ref 3.4–10.8)

## 2020-09-02 PROCEDURE — 85025 COMPLETE CBC W/AUTO DIFF WBC: CPT | Performed by: OBSTETRICS & GYNECOLOGY

## 2020-09-02 RX ORDER — SIMETHICONE 80 MG
80 TABLET,CHEWABLE ORAL 3 TIMES DAILY PRN
Status: DISCONTINUED | OUTPATIENT
Start: 2020-09-02 | End: 2020-09-03 | Stop reason: HOSPADM

## 2020-09-02 RX ORDER — POLYETHYLENE GLYCOL 3350 17 G/17G
17 POWDER, FOR SOLUTION ORAL DAILY
Status: DISCONTINUED | OUTPATIENT
Start: 2020-09-02 | End: 2020-09-03 | Stop reason: HOSPADM

## 2020-09-02 RX ORDER — DOCUSATE SODIUM 100 MG/1
100 CAPSULE, LIQUID FILLED ORAL 2 TIMES DAILY
Status: DISCONTINUED | OUTPATIENT
Start: 2020-09-02 | End: 2020-09-03 | Stop reason: HOSPADM

## 2020-09-02 RX ORDER — OXYCODONE HYDROCHLORIDE AND ACETAMINOPHEN 5; 325 MG/1; MG/1
2 TABLET ORAL EVERY 4 HOURS PRN
Status: DISCONTINUED | OUTPATIENT
Start: 2020-09-02 | End: 2020-09-03 | Stop reason: HOSPADM

## 2020-09-02 RX ADMIN — SIMETHICONE 80 MG: 80 TABLET, CHEWABLE ORAL at 06:00

## 2020-09-02 RX ADMIN — POLYETHYLENE GLYCOL 3350 17 G: 17 POWDER, FOR SOLUTION ORAL at 21:08

## 2020-09-02 RX ADMIN — IBUPROFEN 800 MG: 800 TABLET ORAL at 14:34

## 2020-09-02 RX ADMIN — OXYCODONE AND ACETAMINOPHEN 2 TABLET: 5; 325 TABLET ORAL at 14:34

## 2020-09-02 RX ADMIN — DOCUSATE SODIUM 100 MG: 100 CAPSULE, LIQUID FILLED ORAL at 21:08

## 2020-09-02 RX ADMIN — SIMETHICONE 80 MG: 80 TABLET, CHEWABLE ORAL at 08:46

## 2020-09-02 RX ADMIN — Medication 1 TABLET: at 08:46

## 2020-09-02 RX ADMIN — OXYCODONE HYDROCHLORIDE AND ACETAMINOPHEN 1 TABLET: 5; 325 TABLET ORAL at 01:55

## 2020-09-02 RX ADMIN — IBUPROFEN 800 MG: 800 TABLET ORAL at 05:02

## 2020-09-02 RX ADMIN — OXYCODONE AND ACETAMINOPHEN 2 TABLET: 5; 325 TABLET ORAL at 18:49

## 2020-09-02 RX ADMIN — OXYCODONE AND ACETAMINOPHEN 2 TABLET: 5; 325 TABLET ORAL at 06:00

## 2020-09-02 RX ADMIN — OXYCODONE AND ACETAMINOPHEN 2 TABLET: 5; 325 TABLET ORAL at 10:15

## 2020-09-02 RX ADMIN — OXYCODONE AND ACETAMINOPHEN 2 TABLET: 5; 325 TABLET ORAL at 22:47

## 2020-09-02 RX ADMIN — IBUPROFEN 800 MG: 800 TABLET ORAL at 22:47

## 2020-09-02 NOTE — PLAN OF CARE
Problem: Patient Care Overview  Goal: Plan of Care Review  Outcome: Ongoing (interventions implemented as appropriate)  Flowsheets  Taken 2020 1138 by Kacy Farias, RN  Progress: improving  Outcome Summary: Admittted for Scheduled C/S and BTL this 30 y/o  (now) at 39 3/7 wks gestation pt. of Dr. Mancilla.  C/S delivery with BTL, Spinal anesthesia and delivery of viable male infant,  cc.  JEANNETTE in the OR, Breastfeed infant confidently in OBRR, Dilaudid 1 mg IVP given in OBRR-pain at 2/10 at d/c to PP room.  Taken 2020 0830 by Kacy Curtis, RN  Plan of Care Reviewed With: patient  Goal: Individualization and Mutuality  Outcome: Ongoing (interventions implemented as appropriate)  Goal: Discharge Needs Assessment  Outcome: Ongoing (interventions implemented as appropriate)  Goal: Interprofessional Rounds/Family Conf  Outcome: Ongoing (interventions implemented as appropriate)     Problem: Anesthesia/Analgesia, Neuraxial (Obstetrics)  Goal: Signs and Symptoms of Listed Potential Problems Will be Absent, Minimized or Managed (Anesthesia/Analgesia, Neuraxial)  Outcome: Ongoing (interventions implemented as appropriate)     Problem: Fall Risk,  (Adult,Obstetrics,Pediatric)  Goal: Identify Related Risk Factors and Signs and Symptoms  Outcome: Ongoing (interventions implemented as appropriate)  Goal: Absence of Maternal Fall  Outcome: Ongoing (interventions implemented as appropriate)  Goal: Absence of  Fall/Drop  Outcome: Ongoing (interventions implemented as appropriate)     Problem: Postpartum ( Delivery) (Adult,Obstetrics,Pediatric)  Goal: Signs and Symptoms of Listed Potential Problems Will be Absent, Minimized or Managed (Postpartum)  Outcome: Ongoing (interventions implemented as appropriate)  Goal: Anesthesia/Sedation Recovery  Outcome: Ongoing (interventions implemented as appropriate)     Problem: Breastfeeding (Adult,Obstetrics,Pediatric)  Goal: Signs and Symptoms of  Listed Potential Problems Will be Absent, Minimized or Managed (Breastfeeding)  Outcome: Ongoing (interventions implemented as appropriate)     Problem: Skin Injury Risk (Adult)  Goal: Identify Related Risk Factors and Signs and Symptoms  Outcome: Ongoing (interventions implemented as appropriate)  Goal: Skin Health and Integrity  Outcome: Ongoing (interventions implemented as appropriate)

## 2020-09-02 NOTE — PROGRESS NOTES
"Discharge Planning Assessment  [facility]     Patient Name: Lin Harp  MRN: [id number]  Today's Date: 2020    Admit Date: 2020    Discharge Needs Assessment    No documentation.       Discharge Plan     Row Name 20 1129       Plan    Plan  Infant to discharge home with mother when medically ready. CSW will follow for cord toxicology results. NAI MurilloW    Plan Comments  Mother: Lin Harp, MRN [id number]; Infant: OtiliaBoy \"Stephan\" Loyd, MRN [id number]. CSW was consulted for \"hx of thc use. not during pregnancy. mother 30 year old .\" Both mother and infant had negative urine toxicology screens on admission. No prenatal urine toxicology available in [facility]. Cord toxicology was sent, CSW will follow for results and will report to CPS if warranted. CSW spoke with KIMBERLY Mahoney who had no concerns. CSW met with mother at bedside. Father of infant/spouse, Zhao Harp also at bedside. Mother declined to speak privately with CSW. Mother verified address, phone, and insurance. Parents are aware of process for adding infant to coverage. Mother reports they also have a 10 and an 11 year old sons at home as well. Paternal grandparents are caring for those children while they are at the hospital. Mother reports they have car seat, crib, clothes, diapers, and other infant supplies. Mother reports they are currently in the process of looking for a home to purchase in [place] and are living with paternal grandparents of infant at this time. Mother is not current with [facility]. WIC briefly discussed and info on local [facility] location provided. Mother plans on follow up with [facility] pediatrics. Mother is comfortable scheduling appointments and will have transportation to appointments. Parents denied any other needs or concerns. Both parents polite and cooperative with CSW. CSW provided mother with packet of resources and briefly discussed resources with mother. Packet includes info on: WIC, Healthy " Families [place], and infant supplies. ISABEL Murillo        Destination      Coordination has not been started for this encounter.      Durable Medical Equipment      Coordination has not been started for this encounter.      Dialysis/Infusion      Coordination has not been started for this encounter.      Home Medical Care      Coordination has not been started for this encounter.      Therapy      Coordination has not been started for this encounter.      Community Resources      Coordination has not been started for this encounter.          Demographic Summary     Row Name 20 1128       General Information    Admission Type  inpatient    Arrived From  home    Referral Source  nursing    Reason for Consult  substance use concerns;psychosocial concerns    General Information Comments  hx of thc use. not during pregnancy. mother 30 year old         Functional Status     Row Name 20 1128       Mental Status    General Appearance WDL  WDL       Mental Status Summary    Recent Changes in Mental Status/Cognitive Functioning  unable to assess        Psychosocial     Row Name 20 1128       Behavior WDL    Behavior WDL  WDL       Emotion Mood WDL    Emotion/Mood/Affect WDL  WDL       Speech WDL    Speech WDL  WDL       Perceptual State WDL    Perceptual State WDL  WDL       Thought Process WDL    Thought Process WDL  WDL       Intellectual Performance WDL    Intellectual Performance WDL  WDL       Coping/Stress    Major Change/Loss/Stressor  birth;residence change    Patient Personal Strengths  able to adapt;expressive of emotions;flexibility;future/goal oriented;motivated;positive attitude;resilient;resourceful;self-reliant;strong support system    Sources of Support  parent(s);spouse;other family members        Abuse/Neglect     Row Name 20 1129       Personal Safety    Physical Signs of Abuse Present  no        Legal    No documentation.       Substance Abuse    No documentation.        Patient Forms    No documentation.           MEIR Murillo

## 2020-09-02 NOTE — LACTATION NOTE
This note was copied from a baby's chart.  P2 term LGA baby nursing well per Dad. Mom is sleeping. Baby has had several wet and stool diapers through the night. Will call for any questions.

## 2020-09-02 NOTE — PROGRESS NOTES
"Subjective:  Postpartum Day 1:     The patient feels well.  Pain is well controlled with current medications. The baby is well.  Urinary output is adequate. The patient is ambulating well. The patient is tolerating a normal diet. Flatus has been passed.      Objective:    Vital signs in last 24 hours:  Blood pressure 108/74, pulse 76, temperature 98.8 °F (37.1 °C), temperature source Oral, resp. rate 20, height 180.3 cm (71\"), weight 105 kg (232 lb 6.4 oz), last menstrual period 11/30/2019, SpO2 99 %, currently breastfeeding.      General:    alert, appears stated age and cooperative   Uterine Fundus:   firm   Incision:  healing well, no significant drainage, no dehiscence, no significant erythema     Labs    Rh + / Male infant    Assessment/Plan:.     Postpartum Day #1  - Patient doing well from postoperative standpoint.  Reviewed wound maintenance.  - Circumcision.  I discussed procedure.  Described elective nature.  Discussed risks of procedure including bleeding, infection and aesthetic issues.  Patient agrees to proceed.      Jesus Ha MD    "

## 2020-09-02 NOTE — PLAN OF CARE
Doing well. Cs from yesterday. Sl in place. Fc in place. Breastfeeding on demand. Will continue to monitor.   Problem: Patient Care Overview  Goal: Plan of Care Review  Outcome: Ongoing (interventions implemented as appropriate)  Goal: Individualization and Mutuality  Outcome: Ongoing (interventions implemented as appropriate)  Goal: Discharge Needs Assessment  Outcome: Ongoing (interventions implemented as appropriate)

## 2020-09-03 VITALS
RESPIRATION RATE: 18 BRPM | DIASTOLIC BLOOD PRESSURE: 78 MMHG | HEART RATE: 85 BPM | HEIGHT: 71 IN | TEMPERATURE: 98.7 F | SYSTOLIC BLOOD PRESSURE: 113 MMHG | OXYGEN SATURATION: 99 % | WEIGHT: 232.4 LBS | BODY MASS INDEX: 32.53 KG/M2

## 2020-09-03 RX ORDER — OXYCODONE HYDROCHLORIDE AND ACETAMINOPHEN 5; 325 MG/1; MG/1
2 TABLET ORAL EVERY 6 HOURS PRN
Qty: 30 TABLET | Refills: 0 | Status: SHIPPED | OUTPATIENT
Start: 2020-09-03 | End: 2020-09-15

## 2020-09-03 RX ORDER — IBUPROFEN 800 MG/1
800 TABLET ORAL EVERY 8 HOURS PRN
Qty: 50 TABLET | Refills: 3 | OUTPATIENT
Start: 2020-09-03 | End: 2021-09-27

## 2020-09-03 RX ADMIN — OXYCODONE HYDROCHLORIDE AND ACETAMINOPHEN 1 TABLET: 5; 325 TABLET ORAL at 10:45

## 2020-09-03 RX ADMIN — Medication 1 TABLET: at 10:40

## 2020-09-03 RX ADMIN — POLYETHYLENE GLYCOL 3350 17 G: 17 POWDER, FOR SOLUTION ORAL at 10:41

## 2020-09-03 RX ADMIN — OXYCODONE AND ACETAMINOPHEN 2 TABLET: 5; 325 TABLET ORAL at 02:49

## 2020-09-03 RX ADMIN — DOCUSATE SODIUM 100 MG: 100 CAPSULE, LIQUID FILLED ORAL at 10:41

## 2020-09-03 RX ADMIN — OXYCODONE AND ACETAMINOPHEN 2 TABLET: 5; 325 TABLET ORAL at 06:40

## 2020-09-03 RX ADMIN — IBUPROFEN 800 MG: 800 TABLET ORAL at 06:40

## 2020-09-03 NOTE — DISCHARGE SUMMARY
Date of Discharge:  9/3/2020    Discharge Diagnosis: 1) Postpartum care immediately after delivery     Presenting Problem/History of Present Illness  Previous  section [Z98.891]  Previous  delivery affecting pregnancy [O34.219]       Hospital Course  Patient is a 29 y.o. female  39w3d presented with scheduled repeat.  For events surrounding her delivery please see delivery/op note.  Her postpartum course was uneventful and today POD #2, she is ready for discharge.  She meets all milestones and criteria for discharge and instructions were reviewed and she voiced understanding.     Procedures Performed  Procedure(s):   SECTION REPEAT WITH TUBAL       Consults:   Consults     No orders found from 8/3/2020 to 2020.          Pertinent Test Results: Hg of 9.4 grams     Condition on Discharge:  Stable     Discharge Disposition  Home or Self Care    Discharge Medications     Discharge Medications      New Medications      Instructions Start Date   ibuprofen 800 MG tablet  Commonly known as:  ADVIL,MOTRIN   800 mg, Oral, Every 8 Hours PRN      oxyCODONE-acetaminophen 5-325 MG per tablet  Commonly known as:  PERCOCET   2 tablets, Oral, Every 6 Hours PRN         Continue These Medications      Instructions Start Date   prenatal (CLASSIC) vitamin  tablet  Generic drug:  prenatal vitamin   1 tablet, Oral, Daily         Stop These Medications    aspirin 81 MG tablet            Discharge Diet:   Diet Instructions     Advance Diet As Tolerated            Activity at Discharge:   Activity Instructions     Other Instructions (Specify)      No driving or tub baths for 2 weeks, No heavy lifting and pelvic rest for 6 weeks     Pelvic Rest            Follow-up Appointments  No future appointments.  Additional Instructions for the Follow-ups that You Need to Schedule     Discharge Follow-up with Specialty: Dr. Powers; 2 Weeks   As directed      Specialty:  Dr. Powers    Follow Up:  2 Weeks                Test Results Pending at Discharge       Tito Fishman MD  09/03/20  10:55

## 2020-09-03 NOTE — LACTATION NOTE
Mom reports she is formula feeding now. She plans to pump at home and bottle feed. Encouraged to pump every 3 hours. Gave mom [facility] zoom and mommy and me info.   Lactation Consult Note    Evaluation Completed: 9/3/2020 09:01  Patient Name: Lin Harp  :  1990  MRN:  [id number]     REFERRAL  INFORMATION:                                         DELIVERY HISTORY:          Skin to skin initiation date/time: 2020  8:17 AM   Skin to skin end date/time: 2020  10:45 AM         MATERNAL ASSESSMENT:                               INFANT ASSESSMENT:  Information for the patient's :  Montse Harp [[id number]]   No past medical history on file.                                                                                                                                MATERNAL INFANT FEEDING:                                                                       EQUIPMENT TYPE:                                 BREAST PUMPING:          LACTATION REFERRALS:

## 2020-09-03 NOTE — PROGRESS NOTES
Section Progress Note    Assessment/Plan     Status post  section: Doing well postoperatively.     1) postpartum care immediately following delivery: Doing well, desires discharge home.   2) False positive RPR (was 1:1 titer and negative FTA)   3) anemia postpartum, Hg drop to 9.4 grams, was normal pre op, so just operative loss.  Normal indices, so anticipate improvement on vitamins and diet.     Rh status: O positive   Rubella: immune   Gender: male   COVID ND     Subjective     Postpartum Day 2:  Delivery    The patient feels well. The patient denies emotional concerns. Pain is well controlled with current medications. The baby is well.Urinary output is adequate. The patient is ambulating well. The patient is tolerating a normal diet. Patient reports flatus.    Objective     Vital signs in last 24 hours:  Temp:  [98.2 °F (36.8 °C)-98.7 °F (37.1 °C)] 98.7 °F (37.1 °C)  Heart Rate:  [81-88] 85  Resp:  [16-18] 18  BP: (111-119)/(64-82) 113/78      General:    alert, appears stated age and cooperative   Bowel Sounds:  active   Lochia:  appropriate   Uterine Fundus:   firm   Incision:  healing well, no significant drainage, no dehiscence, no significant erythema   DVT Evaluation:  No evidence of DVT seen on physical exam.     Lab Results   Component Value Date    WBC 11.35 (H) 2020    HGB 9.4 (L) 2020    HCT 27.7 (L) 2020    MCV 92.3 2020     2020         Tito Fishman MD  9/3/2020  10:49

## 2020-09-08 NOTE — PROGRESS NOTES
Continued Stay Note  [facility]     Patient Name: Lin Harp  MRN: [id number]  Today's Date: 9/8/2020    Admit Date: 9/1/2020    Discharge Plan     Row Name 09/08/20 1223       Plan    Plan Comments  Mother: Lin Harp, MRN [id number]; Infant: Stephan Harp, MRN [id number]. CSW reviewed cord toxicology results, which are negative.  Referral to CPS is not warranted at this time. No other concerns noted. ISABEL Murillo        Discharge Codes    No documentation.       Expected Discharge Date and Time     Expected Discharge Date Expected Discharge Time    Sep 3, 2020             MEIR Murillo

## 2020-09-15 ENCOUNTER — POSTPARTUM VISIT (OUTPATIENT)
Dept: OBSTETRICS AND GYNECOLOGY | Facility: CLINIC | Age: 30
End: 2020-09-15

## 2020-09-15 VITALS
HEART RATE: 76 BPM | WEIGHT: 219 LBS | SYSTOLIC BLOOD PRESSURE: 126 MMHG | BODY MASS INDEX: 30.54 KG/M2 | DIASTOLIC BLOOD PRESSURE: 89 MMHG

## 2020-09-15 DIAGNOSIS — Z48.89 ENCOUNTER FOR POSTOPERATIVE WOUND CHECK: Primary | ICD-10-CM

## 2020-09-15 PROCEDURE — 0503F POSTPARTUM CARE VISIT: CPT | Performed by: OBSTETRICS & GYNECOLOGY

## 2020-09-15 NOTE — PROGRESS NOTES
Subjective   Lin Harp is a 29 y.o. female here for incision check    History of Present Illness   Patient is two-week status post repeat  with tubal ligation at 39 weeks.  Pregnancy was complicated by chronic hypertension that was well controlled on no medications.  Patient has no complaints.  She is only using ibuprofen as needed for pain control.  She is bottlefeeding.  Patient is voiding without difficulty and having normal bowel movements.  She denies any concerns for postpartum depression and/or anxiety.    The following portions of the patient's history were reviewed and updated as appropriate: allergies, current medications, past family history, past medical history, past social history, past surgical history and problem list.    Review of Systems   Constitutional: Negative for chills and fever.   Gastrointestinal: Negative for abdominal pain and constipation.   All other systems reviewed and are negative.      Objective   Physical Exam  Vitals signs reviewed.   Constitutional:       Appearance: She is well-developed.   Cardiovascular:      Rate and Rhythm: Normal rate and regular rhythm.   Pulmonary:      Effort: Pulmonary effort is normal.      Breath sounds: Normal breath sounds.   Abdominal:      General: There is no distension.      Palpations: Abdomen is soft.      Tenderness: There is no abdominal tenderness. There is no guarding or rebound.      Comments: Incision intact with no redness, drainage, or tenderness     Neurological:      Mental Status: She is alert.           Assessment/Plan   Lin was seen today for postpartum care.    Diagnoses and all orders for this visit:    Encounter for postoperative wound check    Incision is healing appropriately.  Discussed continued postoperative wound care and restrictions.  Patient will follow-up in 4 weeks for postpartum physical.

## 2020-10-13 ENCOUNTER — POSTPARTUM VISIT (OUTPATIENT)
Dept: OBSTETRICS AND GYNECOLOGY | Facility: CLINIC | Age: 30
End: 2020-10-13

## 2020-10-13 VITALS
WEIGHT: 213.2 LBS | SYSTOLIC BLOOD PRESSURE: 122 MMHG | DIASTOLIC BLOOD PRESSURE: 82 MMHG | HEART RATE: 100 BPM | BODY MASS INDEX: 29.74 KG/M2

## 2020-10-13 PROCEDURE — 0503F POSTPARTUM CARE VISIT: CPT | Performed by: OBSTETRICS & GYNECOLOGY

## 2020-10-13 NOTE — PROGRESS NOTES
Subjective   Lin Harp is a 29 y.o. female here for 6 wk pp exam.     History of Present Illness   Patient is a 29-year-old female that presents for her 6-week postpartum visit.  Patient had a repeat  with tubal ligation at 39 weeks.  Her pregnancy was complicated by chronic hypertension that was well controlled on no medications.  Patient has no complaints.  She is bottlefeeding.  Patient is currently having her first menstrual period since delivery.  She plans to return to work on November 10.  She denies any concern for postpartum depression and/or anxiety.    The following portions of the patient's history were reviewed and updated as appropriate: allergies, current medications, past family history, past medical history, past social history, past surgical history and problem list.    Review of Systems   Genitourinary: Negative for menstrual problem and pelvic pain.   All other systems reviewed and are negative.      Objective   Physical Exam  Vitals signs reviewed. Exam conducted with a chaperone present.   Constitutional:       Appearance: She is well-developed.   Cardiovascular:      Rate and Rhythm: Normal rate and regular rhythm.   Pulmonary:      Effort: Pulmonary effort is normal.      Breath sounds: Normal breath sounds.   Chest:      Breasts:         Right: No inverted nipple, mass, nipple discharge, skin change or tenderness.         Left: No inverted nipple, mass, nipple discharge, skin change or tenderness.   Abdominal:      General: There is no distension.      Palpations: Abdomen is soft.      Tenderness: There is no abdominal tenderness.      Comments: Incision intact with no redness, drainage, or tenderness     Neurological:      Mental Status: She is alert.           Assessment/Plan   Diagnoses and all orders for this visit:    1. Routine postpartum follow-up (Primary)      Patient is doing well postpartum.  She may resume all normal activity with no restrictions.  Patient declined  pelvic exam today due to vaginal bleeding.  She may follow-up in 1 year for annual exam or sooner if needed.

## 2021-03-01 RX ORDER — PNV NO.95/FERROUS FUM/FOLIC AC 28MG-0.8MG
TABLET ORAL
Qty: 30 TABLET | Refills: 12 | OUTPATIENT
Start: 2021-03-01 | End: 2021-09-27

## 2021-09-27 PROCEDURE — U0004 COV-19 TEST NON-CDC HGH THRU: HCPCS | Performed by: NURSE PRACTITIONER

## 2021-09-30 ENCOUNTER — TELEPHONE (OUTPATIENT)
Dept: URGENT CARE | Facility: CLINIC | Age: 31
End: 2021-09-30

## 2021-09-30 NOTE — TELEPHONE ENCOUNTER
Patient called requesting change in abx. Stated she is not improving.     After speaking with Giovanna VENEGAS, she will not change abx. Patient should allow more time for medication to start to work. Illness could also be viral in which case abx will not help.     LVM for patient to call UC.

## 2024-05-06 ENCOUNTER — NURSE TRIAGE (OUTPATIENT)
Dept: CALL CENTER | Facility: HOSPITAL | Age: 34
End: 2024-05-06
Payer: COMMERCIAL

## 2024-06-12 ENCOUNTER — OFFICE VISIT (OUTPATIENT)
Dept: OBSTETRICS AND GYNECOLOGY | Facility: CLINIC | Age: 34
End: 2024-06-12
Payer: COMMERCIAL

## 2024-06-12 VITALS
WEIGHT: 211 LBS | SYSTOLIC BLOOD PRESSURE: 129 MMHG | HEIGHT: 71 IN | DIASTOLIC BLOOD PRESSURE: 78 MMHG | BODY MASS INDEX: 29.54 KG/M2

## 2024-06-12 DIAGNOSIS — Z01.419 ENCOUNTER FOR GYNECOLOGICAL EXAMINATION WITHOUT ABNORMAL FINDING: Primary | ICD-10-CM

## 2024-06-12 RX ORDER — CELECOXIB 200 MG/1
200 CAPSULE ORAL DAILY PRN
Qty: 90 CAPSULE | OUTPATIENT
Start: 2024-06-12

## 2024-06-12 RX ORDER — CELECOXIB 200 MG/1
200 CAPSULE ORAL DAILY PRN
Qty: 30 CAPSULE | Refills: 11 | Status: SHIPPED | OUTPATIENT
Start: 2024-06-12

## 2024-06-12 NOTE — PROGRESS NOTES
GYN Annual Exam     CC- Here for annual exam.     Lin Harp is a 33 y.o. female who presents for annual well woman exam. Periods are regular every 28-30 days, lasting 6 days. Dysmenorrhea:severe, occurring throughout menses. Cyclic symptoms include none. No intermenstrual bleeding, spotting, or discharge.    She had tubal sterilization about 4 years ago.  She states that the pain with her cycles and the heaviness of her menses have both increased since the tubal sterilization procedure.  Menorrhagia with dysmenorrhea.    OB History          2    Para   2    Term   2            AB        Living   2         SAB        IAB        Ectopic        Molar        Multiple   0    Live Births   1                Current contraception: tubal ligation  History of abnormal Pap smear: no  Family history of uterine, colon or ovarian cancer: Cervical and ovarian cancer in a maternal aunt  History of abnormal mammogram: no  Family history of breast cancer: no  Last Pap : Several years ago    Past Medical History:   Diagnosis Date    ADD (attention deficit disorder)     Anxiety     Gestational hypertension     PMS (premenstrual syndrome)     PONV (postoperative nausea and vomiting) 2020    Varicella        Past Surgical History:   Procedure Laterality Date    BILE DUCT STENT PLACEMENT      Subsequent removal     SECTION       SECTION WITH TUBAL N/A 2020    Procedure:  SECTION REPEAT WITH TUBAL;  Surgeon: Kacy Mancilla MD;  Location: [facility] DELIVERY;  Service: Obstetrics/Gynecology;  Laterality: N/A;    TUBAL ABDOMINAL LIGATION  2020    WISDOM TOOTH EXTRACTION           Current Outpatient Medications:     amphetamine-dextroamphetamine (ADDERALL) 10 MG tablet, Take 1.5 tablets by mouth Daily., Disp: , Rfl:     brompheniramine-pseudoephedrine-DM 30-2-10 MG/5ML syrup, Take 5 mL by mouth 4 (Four) Times a Day As Needed for Congestion or Cough., Disp: 118 mL, Rfl: 0     "doxycycline (VIBRAMYCIN) 100 MG capsule, Take 1 capsule by mouth 2 (Two) Times a Day., Disp: 20 capsule, Rfl: 0    Allergies   Allergen Reactions    Cephalexin     Erythromycin     Penicillins        Social History     Tobacco Use    Smoking status: Former     Current packs/day: 0.33     Average packs/day: 0.3 packs/day for 8.1 years (2.7 ttl pk-yrs)     Types: Cigarettes    Smokeless tobacco: Never   Vaping Use    Vaping status: Never Used   Substance Use Topics    Alcohol use: Not Currently     Comment: Occasional    Drug use: Not Currently     Types: Marijuana     Comment: Past history       Family History   Problem Relation Age of Onset    Stroke Mother     Stroke Paternal Grandmother     Lung cancer Maternal Grandmother     Cervical cancer Maternal Aunt     Ovarian cancer Maternal Aunt     Uterine cancer Neg Hx     Colon cancer Neg Hx        Review of Systems   Constitutional:  Negative for fatigue and fever.   Genitourinary:  Positive for menstrual problem and urgency.        Occasional urge incontinence       /78   Ht 180.3 cm (71\")   Wt 95.7 kg (211 lb)   LMP 06/08/2024 (Exact Date)   BMI 29.43 kg/m²     Physical Exam  Genitourinary:      Bladder and urethral meatus normal.      No lesions in the vagina.      Right Labia: No lesions.     Left Labia: No lesions.     No vaginal discharge, tenderness or bleeding.      No vaginal prolapse present.     No vaginal atrophy present.       Right Adnexa: not tender, not full and no mass present.     Left Adnexa: not tender, not full and no mass present.     No cervical motion tenderness, discharge, friability or lesion.      Uterus is not enlarged, fixed or tender.      No uterine mass detected.     Uterus is midaxial.   Breasts:     Right: No mass, nipple discharge, skin change or tenderness.      Left: No mass, nipple discharge, skin change or tenderness.   Neck:      Thyroid: No thyroid mass or thyromegaly.   Abdominal:      General: Abdomen is flat.      " Palpations: Abdomen is soft. There is no mass.      Tenderness: There is no abdominal tenderness.   Neurological:      Mental Status: She is alert.   Vitals reviewed.               Assessment     1) GYN annual well woman exam.   2) menorrhagia with dysmenorrhea.  Options discussed such as oral contraceptive use versus IUD and endometrial ablation.  Also offered trying a different anti-inflammatory.  She wants to go that route first and we will try Celebrex 200 mg daily  during her cycle.  3) urinary urgency with some urge incontinence.  I discussed first-line use of medication such as anticholinergic in the form of oxybutynin.  Patient wishes to give that some thought before trying that.  I also offered her a referral to urogynecology for discussion.     Plan     1) Breast Health - Clinical breast exam yearly, Discussed [employer] cancer society recommendations for breast cancer screening, and Self breast awareness monthly  2) Pap -done today with high-risk HPV  3) Smoking status-negative  4) Encouraged to be wary of information obtained via social media and internet based on source and search.  5) Follow up prn and one year.       Ryan Fitzgerald MD   6/12/2024  11:33 EDT

## 2024-06-14 LAB
CYTOLOGIST CVX/VAG CYTO: NORMAL
CYTOLOGY CVX/VAG DOC CYTO: NORMAL
CYTOLOGY CVX/VAG DOC THIN PREP: NORMAL
DX ICD CODE: NORMAL
HPV GENOTYPE REFLEX: NORMAL
HPV I/H RISK 4 DNA CVX QL PROBE+SIG AMP: NEGATIVE
Lab: NORMAL
OTHER STN SPEC: NORMAL
STAT OF ADQ CVX/VAG CYTO-IMP: NORMAL

## 2025-05-13 ENCOUNTER — OFFICE VISIT (OUTPATIENT)
Dept: OBSTETRICS AND GYNECOLOGY | Facility: CLINIC | Age: 35
End: 2025-05-13
Payer: COMMERCIAL

## 2025-05-13 VITALS
DIASTOLIC BLOOD PRESSURE: 90 MMHG | SYSTOLIC BLOOD PRESSURE: 145 MMHG | WEIGHT: 200.4 LBS | HEIGHT: 71 IN | BODY MASS INDEX: 28.06 KG/M2

## 2025-05-13 DIAGNOSIS — N92.0 MENORRHAGIA WITH REGULAR CYCLE: Primary | ICD-10-CM

## 2025-05-13 NOTE — PROGRESS NOTES
"        REASON FOR FOLLOWUP/CHIEF COMPLAINT:  (Patient is here today regarding heavy and painful cycles. patient states that its in the beginning and middle of her cycle. She states that antiinflammatory did not work for her)      HISTORY OF PRESENT ILLNESS: Increasing pain and has pain a week before her cycle and then bleeds for 6 to 7 days and has pain throughout that.  She tried IUD in the past and did not have relief and also has tried multiple anti-inflammatories, the last of which was Celebrex.  She wishes to consider definitive management.  Will get pelvic ultrasound lined up.      Past Medical History, Past Surgical History, Social History, Family History have been reviewed and are without significant changes except as mentioned.    Review of Systems   Review of Systems   Genitourinary:  Positive for menstrual problem and pelvic pain.       Medications:  The current medication list was reviewed in the EMR    ALLERGIES:    Allergies   Allergen Reactions    Cephalexin     Erythromycin     Penicillins               Vitals:    05/13/25 1437   BP: 145/90   Weight: 90.9 kg (200 lb 6.4 oz)   Height: 180.3 cm (71\")     Physical Exam    CONSTITUTIONAL:  Vital signs reviewed.  No distress, looks comfortable.  EYES:  Conjunctiva and lids unremarkable.  PERRLA  EARS,NOSE,MOUTH,THROAT:  Ears and nose appear unremarkable.  Lips, teeth, gums appear unremarkable.  RESPIRATORY:  Normal respiratory effort.  Lungs clear to auscultation bilaterally.  CARDIOVASCULAR:  Normal S1, S2.  No murmurs rubs or gallops.  No significant lower extremity edema.  GASTROINTESTINAL: Abdomen appears unremarkable.  Nontender.  No hepatomegaly.  No splenomegaly.  NEURO: cranial nerves 2-12 grossly intact.  No focal deficits.  Appears to have equal strength all 4 extremities.  MUSCULOSKELETAL:  Unremarkable digits/nails.  No cyanosis or clubbing.  SKIN:  Warm.  No rashes.  PSYCHIATRIC:  Normal judgment and insight.  Normal mood and affect.   "     RECENT LABS:  WBC   Date Value Ref Range Status   09/02/2020 11.35 (H) 3.40 - 10.80 10*3/mm3 Final   09/01/2020 8.04 3.40 - 10.80 10*3/mm3 Final   07/27/2020 8.35 3.40 - 10.80 10*3/mm3 Final   06/12/2020 9.67 3.40 - 10.80 10*3/mm3 Final   01/31/2020 7.1 3.4 - 10.8 x10E3/uL Final   11/21/2018 3.93 (L) 4.5 - 11.0 10*3/uL Final   02/20/2018 5.1 4.5 - 11.0 10*3/uL Final     Hemoglobin   Date Value Ref Range Status   09/02/2020 9.4 (L) 12.0 - 15.9 g/dL Final   09/01/2020 12.8 12.0 - 15.9 g/dL Final   07/27/2020 12.5 12.0 - 15.9 g/dL Final   06/12/2020 12.1 12.0 - 15.9 g/dL Final   01/31/2020 13.2 11.1 - 15.9 g/dL Final   11/21/2018 12.3 12.0 - 16.0 g/dL Final   02/20/2018 13.2 12.0 - 16.0 g/dL Final     Platelets   Date Value Ref Range Status   09/02/2020 217 140 - 450 10*3/mm3 Final   09/01/2020 282 140 - 450 10*3/mm3 Final   07/27/2020 303 140 - 450 10*3/mm3 Final   06/12/2020 336 140 - 450 10*3/mm3 Final   01/31/2020 397 150 - 450 x10E3/uL Final   11/21/2018 270 140 - 440 10*3/uL Final   02/20/2018 257 150 - 450 10*3/uL Final       ASSESSMENT/PLAN:  Lin Harp [unfilled]   Cyclic pelvic pain with severe dysmenorrhea and menorrhagia.  Will get pelvic ultrasound and discuss doing laparoscopic hysterectomy for definitive management.

## (undated) DEVICE — SKIN AFFIX SURG ADHESIVE 72/CS 0.55ML: Brand: MEDLINE

## (undated) DEVICE — GLV SURG SENSICARE MICRO PF LF 6 STRL

## (undated) DEVICE — ANTIBACTERIAL UNDYED BRAIDED (POLYGLACTIN 910), SYNTHETIC ABSORBABLE SUTURE: Brand: COATED VICRYL

## (undated) DEVICE — SUT MNCRYL PLS ANTIB UD 4/0 PS2 18IN

## (undated) DEVICE — 3M™ STERI-STRIP™ COMPOUND BENZOIN TINCTURE 40 BAGS/CARTON 4 CARTONS/CASE C1544: Brand: 3M™ STERI-STRIP™

## (undated) DEVICE — SOL IRR H2O BTL 1000ML STRL

## (undated) DEVICE — 3M(TM) TEGADERM(TM) TRANSPARENT FILM DRESSING FRAME STYLE 1627: Brand: 3M™ TEGADERM™

## (undated) DEVICE — GLV SURG SENSICARE MICRO PF LF 6.5 STRL

## (undated) DEVICE — SUT VIC 0 CT 36IN J958H

## (undated) DEVICE — 3M™ STERI-STRIP™ REINFORCED ADHESIVE SKIN CLOSURES, R1547, 1/2 IN X 4 IN (12 MM X 100 MM), 6 STRIPS/ENVELOPE: Brand: 3M™ STERI-STRIP™

## (undated) DEVICE — KENDALL SCD EXPRESS SLEEVES, KNEE LENGTH, MEDIUM: Brand: KENDALL SCD